# Patient Record
Sex: FEMALE | Race: WHITE
[De-identification: names, ages, dates, MRNs, and addresses within clinical notes are randomized per-mention and may not be internally consistent; named-entity substitution may affect disease eponyms.]

---

## 2018-08-29 ENCOUNTER — HOSPITAL ENCOUNTER (EMERGENCY)
Dept: HOSPITAL 9 - MADERS | Age: 63
Discharge: HOME | End: 2018-08-29
Payer: MEDICARE

## 2018-08-29 DIAGNOSIS — F31.9: ICD-10-CM

## 2018-08-29 DIAGNOSIS — I10: ICD-10-CM

## 2018-08-29 DIAGNOSIS — K04.7: Primary | ICD-10-CM

## 2018-08-29 DIAGNOSIS — Z79.899: ICD-10-CM

## 2018-08-29 LAB
ANION GAP SERPL CALC-SCNC: 15 MMOL/L (ref 10–20)
BUN SERPL-MCNC: 23 MG/DL (ref 9.8–20.1)
CALCIUM SERPL-MCNC: 9.8 MG/DL (ref 7.8–10.44)
CHLORIDE SERPL-SCNC: 105 MMOL/L (ref 98–107)
CO2 SERPL-SCNC: 22 MMOL/L (ref 23–31)
CREAT CL PREDICTED SERPL C-G-VRATE: 0 ML/MIN (ref 70–130)
GLUCOSE SERPL-MCNC: 99 MG/DL (ref 80–115)
HGB BLD-MCNC: 17.1 G/DL (ref 12–16)
MANUAL DIFF??: YES
MCH RBC QN AUTO: 30 PG (ref 27–31)
MCV RBC AUTO: 90.6 FL (ref 78–98)
MDIFF COMPLETE?: YES
PLATELET # BLD AUTO: 217 THOU/UL (ref 130–400)
PLATELET BLD QL SMEAR: (no result)
POTASSIUM SERPL-SCNC: 4.3 MMOL/L (ref 3.5–5.1)
RBC # BLD AUTO: 5.7 MILL/UL (ref 4.2–5.4)
SODIUM SERPL-SCNC: 138 MMOL/L (ref 136–145)
WBC # BLD AUTO: 9.7 THOU/UL (ref 4.8–10.8)

## 2018-08-29 PROCEDURE — 83605 ASSAY OF LACTIC ACID: CPT

## 2018-08-29 PROCEDURE — 96375 TX/PRO/DX INJ NEW DRUG ADDON: CPT

## 2018-08-29 PROCEDURE — 85025 COMPLETE CBC W/AUTO DIFF WBC: CPT

## 2018-08-29 PROCEDURE — 96365 THER/PROPH/DIAG IV INF INIT: CPT

## 2018-08-29 PROCEDURE — 80048 BASIC METABOLIC PNL TOTAL CA: CPT

## 2019-07-19 ENCOUNTER — HOSPITAL ENCOUNTER (INPATIENT)
Dept: HOSPITAL 92 - ERS | Age: 64
LOS: 4 days | Discharge: HOME | DRG: 501 | End: 2019-07-23
Attending: SURGERY | Admitting: SURGERY
Payer: MEDICARE

## 2019-07-19 ENCOUNTER — HOSPITAL ENCOUNTER (EMERGENCY)
Dept: HOSPITAL 9 - MADERS | Age: 64
Discharge: ANOTHER HOSPITAL | End: 2019-07-19
Payer: MEDICARE

## 2019-07-19 VITALS — BODY MASS INDEX: 18.6 KG/M2

## 2019-07-19 DIAGNOSIS — I73.9: ICD-10-CM

## 2019-07-19 DIAGNOSIS — Z88.1: ICD-10-CM
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
## 2019-07-19 DIAGNOSIS — S62.617B: Primary | ICD-10-CM

## 2019-07-19 DIAGNOSIS — Z90.49: ICD-10-CM

## 2019-07-19 DIAGNOSIS — I13.0: ICD-10-CM

## 2019-07-19 DIAGNOSIS — N17.9: ICD-10-CM

## 2019-07-19 DIAGNOSIS — I69.320: ICD-10-CM

## 2019-07-19 DIAGNOSIS — Z86.73: ICD-10-CM

## 2019-07-19 DIAGNOSIS — Z90.710: ICD-10-CM

## 2019-07-19 DIAGNOSIS — S80.211A: ICD-10-CM

## 2019-07-19 DIAGNOSIS — Z79.899: ICD-10-CM

## 2019-07-19 DIAGNOSIS — Z88.0: ICD-10-CM

## 2019-07-19 DIAGNOSIS — I50.9: ICD-10-CM

## 2019-07-19 DIAGNOSIS — I25.10: ICD-10-CM

## 2019-07-19 DIAGNOSIS — N18.9: ICD-10-CM

## 2019-07-19 DIAGNOSIS — S51.812A: ICD-10-CM

## 2019-07-19 DIAGNOSIS — F17.210: ICD-10-CM

## 2019-07-19 DIAGNOSIS — S51.811A: ICD-10-CM

## 2019-07-19 DIAGNOSIS — I34.0: ICD-10-CM

## 2019-07-19 DIAGNOSIS — I10: ICD-10-CM

## 2019-07-19 DIAGNOSIS — S80.212A: ICD-10-CM

## 2019-07-19 DIAGNOSIS — F31.9: ICD-10-CM

## 2019-07-19 LAB
ALBUMIN SERPL BCG-MCNC: 4.2 G/DL (ref 3.4–4.8)
ALP SERPL-CCNC: 123 U/L (ref 40–150)
ALT SERPL W P-5'-P-CCNC: 14 U/L (ref 8–55)
ANION GAP SERPL CALC-SCNC: 18 MMOL/L (ref 10–20)
AST SERPL-CCNC: 24 U/L (ref 5–34)
BASOPHILS # BLD AUTO: 0.1 THOU/UL (ref 0–0.2)
BASOPHILS NFR BLD AUTO: 0.9 % (ref 0–1)
BILIRUB SERPL-MCNC: 0.4 MG/DL (ref 0.2–1.2)
BUN SERPL-MCNC: 31 MG/DL (ref 9.8–20.1)
CALCIUM SERPL-MCNC: 9.3 MG/DL (ref 7.8–10.44)
CHLORIDE SERPL-SCNC: 109 MMOL/L (ref 98–107)
CO2 SERPL-SCNC: 21 MMOL/L (ref 23–31)
CREAT CL PREDICTED SERPL C-G-VRATE: 0 ML/MIN (ref 70–130)
EOSINOPHIL # BLD AUTO: 0.3 THOU/UL (ref 0–0.7)
EOSINOPHIL NFR BLD AUTO: 3.6 % (ref 0–10)
GLOBULIN SER CALC-MCNC: 3 G/DL (ref 2.4–3.5)
GLUCOSE SERPL-MCNC: 88 MG/DL (ref 80–115)
HGB BLD-MCNC: 15.8 G/DL (ref 12–16)
LYMPHOCYTES # BLD AUTO: 1.2 THOU/UL (ref 1.2–3.4)
LYMPHOCYTES NFR BLD AUTO: 14.8 % (ref 21–51)
MCH RBC QN AUTO: 30 PG (ref 27–31)
MCV RBC AUTO: 94.5 FL (ref 78–98)
MONOCYTES # BLD AUTO: 0.6 THOU/UL (ref 0.11–0.59)
MONOCYTES NFR BLD AUTO: 7.5 % (ref 0–10)
NEUTROPHILS # BLD AUTO: 6.1 THOU/UL (ref 1.4–6.5)
NEUTROPHILS NFR BLD AUTO: 73.3 % (ref 42–75)
PLATELET # BLD AUTO: 157 THOU/UL (ref 130–400)
POTASSIUM SERPL-SCNC: 5.1 MMOL/L (ref 3.5–5.1)
RBC # BLD AUTO: 5.28 MILL/UL (ref 4.2–5.4)
SODIUM SERPL-SCNC: 143 MMOL/L (ref 136–145)
WBC # BLD AUTO: 8.3 THOU/UL (ref 4.8–10.8)

## 2019-07-19 PROCEDURE — 36415 COLL VENOUS BLD VENIPUNCTURE: CPT

## 2019-07-19 PROCEDURE — 90732 PPSV23 VACC 2 YRS+ SUBQ/IM: CPT

## 2019-07-19 PROCEDURE — 90715 TDAP VACCINE 7 YRS/> IM: CPT

## 2019-07-19 PROCEDURE — 83735 ASSAY OF MAGNESIUM: CPT

## 2019-07-19 PROCEDURE — 96365 THER/PROPH/DIAG IV INF INIT: CPT

## 2019-07-19 PROCEDURE — 76000 FLUOROSCOPY <1 HR PHYS/QHP: CPT

## 2019-07-19 PROCEDURE — 85025 COMPLETE CBC W/AUTO DIFF WBC: CPT

## 2019-07-19 PROCEDURE — 85610 PROTHROMBIN TIME: CPT

## 2019-07-19 PROCEDURE — 36416 COLLJ CAPILLARY BLOOD SPEC: CPT

## 2019-07-19 PROCEDURE — 93306 TTE W/DOPPLER COMPLETE: CPT

## 2019-07-19 PROCEDURE — 83605 ASSAY OF LACTIC ACID: CPT

## 2019-07-19 PROCEDURE — 26720 TREAT FINGER FRACTURE EACH: CPT

## 2019-07-19 PROCEDURE — 86140 C-REACTIVE PROTEIN: CPT

## 2019-07-19 PROCEDURE — 84100 ASSAY OF PHOSPHORUS: CPT

## 2019-07-19 PROCEDURE — 96366 THER/PROPH/DIAG IV INF ADDON: CPT

## 2019-07-19 PROCEDURE — 93010 ELECTROCARDIOGRAM REPORT: CPT

## 2019-07-19 PROCEDURE — 90471 IMMUNIZATION ADMIN: CPT

## 2019-07-19 PROCEDURE — G0390 TRAUMA RESPONS W/HOSP CRITI: HCPCS

## 2019-07-19 PROCEDURE — 93005 ELECTROCARDIOGRAM TRACING: CPT

## 2019-07-19 PROCEDURE — G0009 ADMIN PNEUMOCOCCAL VACCINE: HCPCS

## 2019-07-19 PROCEDURE — 71045 X-RAY EXAM CHEST 1 VIEW: CPT

## 2019-07-19 PROCEDURE — 80053 COMPREHEN METABOLIC PANEL: CPT

## 2019-07-19 PROCEDURE — 83880 ASSAY OF NATRIURETIC PEPTIDE: CPT

## 2019-07-19 PROCEDURE — 96368 THER/DIAG CONCURRENT INF: CPT

## 2019-07-19 PROCEDURE — 94640 AIRWAY INHALATION TREATMENT: CPT

## 2019-07-19 PROCEDURE — 80048 BASIC METABOLIC PNL TOTAL CA: CPT

## 2019-07-19 PROCEDURE — 82533 TOTAL CORTISOL: CPT

## 2019-07-19 NOTE — RAD
3 views left hand:

7/19/2019



COMPARISON: None



HISTORY: Injury, trauma, pain, dog bite



FINDINGS: There is a mildly displaced mildly comminuted obliquely oriented fracture involving the bas
e of the fifth proximal phalanx. There is dorsal angulation and mild medial angulation as well as

mild proximal and medial displacement. No intra-articular extension. No evidence for dislocation. No 
additional fracture is noted.



IMPRESSION: Fracture at the base of the fifth proximal phalanx, presumably an open fracture given his
tory of dog bite.



Reported By: Frank Shanks 

Electronically Signed:  7/19/2019 7:40 PM

## 2019-07-20 LAB
ANION GAP SERPL CALC-SCNC: 10 MMOL/L (ref 10–20)
ANION GAP SERPL CALC-SCNC: 11 MMOL/L (ref 10–20)
BASOPHILS # BLD AUTO: 0 THOU/UL (ref 0–0.2)
BASOPHILS # BLD AUTO: 0 THOU/UL (ref 0–0.2)
BASOPHILS NFR BLD AUTO: 0.1 % (ref 0–1)
BASOPHILS NFR BLD AUTO: 0.1 % (ref 0–1)
BUN SERPL-MCNC: 22 MG/DL (ref 9.8–20.1)
BUN SERPL-MCNC: 24 MG/DL (ref 9.8–20.1)
CALCIUM SERPL-MCNC: 8.4 MG/DL (ref 7.8–10.44)
CALCIUM SERPL-MCNC: 8.5 MG/DL (ref 7.8–10.44)
CHLORIDE SERPL-SCNC: 111 MMOL/L (ref 98–107)
CHLORIDE SERPL-SCNC: 113 MMOL/L (ref 98–107)
CO2 SERPL-SCNC: 21 MMOL/L (ref 23–31)
CO2 SERPL-SCNC: 22 MMOL/L (ref 23–31)
CREAT CL PREDICTED SERPL C-G-VRATE: 0 ML/MIN (ref 70–130)
CREAT CL PREDICTED SERPL C-G-VRATE: 40 ML/MIN (ref 70–130)
EOSINOPHIL # BLD AUTO: 0.2 THOU/UL (ref 0–0.7)
EOSINOPHIL # BLD AUTO: 0.2 THOU/UL (ref 0–0.7)
EOSINOPHIL NFR BLD AUTO: 2.9 % (ref 0–10)
EOSINOPHIL NFR BLD AUTO: 3.3 % (ref 0–10)
GLUCOSE SERPL-MCNC: 86 MG/DL (ref 80–115)
GLUCOSE SERPL-MCNC: 94 MG/DL (ref 80–115)
HGB BLD-MCNC: 12.8 G/DL (ref 12–16)
HGB BLD-MCNC: 14.1 G/DL (ref 12–16)
INR PPP: 1
LYMPHOCYTES # BLD: 0.9 THOU/UL (ref 1.2–3.4)
LYMPHOCYTES # BLD: 1.2 THOU/UL (ref 1.2–3.4)
LYMPHOCYTES NFR BLD AUTO: 15.6 % (ref 21–51)
LYMPHOCYTES NFR BLD AUTO: 17.4 % (ref 21–51)
MAGNESIUM SERPL-MCNC: 1.7 MG/DL (ref 1.6–2.6)
MAGNESIUM SERPL-MCNC: 1.9 MG/DL (ref 1.6–2.6)
MCH RBC QN AUTO: 30.2 PG (ref 27–31)
MCH RBC QN AUTO: 30.7 PG (ref 27–31)
MCV RBC AUTO: 93.9 FL (ref 78–98)
MCV RBC AUTO: 96.2 FL (ref 78–98)
MDIFF COMPLETE?: YES
MONOCYTES # BLD AUTO: 0.4 THOU/UL (ref 0.11–0.59)
MONOCYTES # BLD AUTO: 0.8 THOU/UL (ref 0.11–0.59)
MONOCYTES NFR BLD AUTO: 11.1 % (ref 0–10)
MONOCYTES NFR BLD AUTO: 7.8 % (ref 0–10)
NEUTROPHILS # BLD AUTO: 4.2 THOU/UL (ref 1.4–6.5)
NEUTROPHILS # BLD AUTO: 4.9 THOU/UL (ref 1.4–6.5)
NEUTROPHILS NFR BLD AUTO: 68.4 % (ref 42–75)
NEUTROPHILS NFR BLD AUTO: 73.2 % (ref 42–75)
PLATELET # BLD AUTO: 117 THOU/UL (ref 130–400)
PLATELET # BLD AUTO: 131 THOU/UL (ref 130–400)
POTASSIUM SERPL-SCNC: 4.3 MMOL/L (ref 3.5–5.1)
POTASSIUM SERPL-SCNC: 4.8 MMOL/L (ref 3.5–5.1)
PROTHROMBIN TIME: 13.6 SEC (ref 12–14.7)
RBC # BLD AUTO: 4.22 MILL/UL (ref 4.2–5.4)
RBC # BLD AUTO: 4.57 MILL/UL (ref 4.2–5.4)
SODIUM SERPL-SCNC: 139 MMOL/L (ref 136–145)
SODIUM SERPL-SCNC: 140 MMOL/L (ref 136–145)
WBC # BLD AUTO: 5.7 THOU/UL (ref 4.8–10.8)
WBC # BLD AUTO: 7.1 THOU/UL (ref 4.8–10.8)

## 2019-07-20 PROCEDURE — 0J9H0ZZ DRAINAGE OF LEFT LOWER ARM SUBCUTANEOUS TISSUE AND FASCIA, OPEN APPROACH: ICD-10-PCS | Performed by: ORTHOPAEDIC SURGERY

## 2019-07-20 PROCEDURE — 0PSV34Z REPOSITION LEFT FINGER PHALANX WITH INTERNAL FIXATION DEVICE, PERCUTANEOUS APPROACH: ICD-10-PCS | Performed by: ORTHOPAEDIC SURGERY

## 2019-07-20 PROCEDURE — 0J9K0ZZ DRAINAGE OF LEFT HAND SUBCUTANEOUS TISSUE AND FASCIA, OPEN APPROACH: ICD-10-PCS | Performed by: ORTHOPAEDIC SURGERY

## 2019-07-20 NOTE — RAD
Portable frontal chest radiograph:

7/20/2019



COMPARISON: None



HISTORY: Preoperative patient



FINDINGS: Lungs are clear. Heart and mediastinal contours appear within normal limits. Mild atheroscl
erotic calcification of the aortic arch.



IMPRESSION: No acute findings.



Reported By: Frank Shanks 

Electronically Signed:  7/20/2019 12:12 AM

## 2019-07-20 NOTE — HP
CHIEF COMPLAINT:  Dog bite of arm.



HISTORY OF PRESENT ILLNESS:  The patient is a 64-year-old female, who was trying to

break up a fight between 2 dogs.  They then turned on her and lacerated her arms,

left greater than right.  She also had what appears to be an open fracture and is

going to the OR for that. 



PAST MEDICAL HISTORY:  CVA, coronary artery disease, and hypertension.



PAST SURGICAL HISTORY:  Appendectomy and hysterectomy.



MEDICATIONS:  

1. Amlodipine.

2. Atorvastatin.

3. Gabapentin.

4. Metoprolol.



SOCIAL HISTORY:  She does still smoke 1/2 packs per day.  Past methamphetamine use.

She does drink alcohol.  She has bipolar. 



ALLERGIES:  PENICILLIN AND DOXYCYCLINE.



PHYSICAL EXAMINATION:

VITAL SIGNS:  Temperature 97.7, pulse 65, and blood pressure 99/64. 

GENERAL:  She is a thin female, looks older than stated age, in no apparent

distress.  Left arm is bandaged. 

HEENT:  Otherwise unremarkable. 

LUNGS:  Clear. 

HEART:  Regular rate and rhythm. 

ABDOMEN:  Soft, nondistended, and nontender. 

EXTREMITIES:  She has multiple lacerations of her left forearm and puncture wounds

of the right forearm.  She is currently in a splint on the left.  Pulses intact.

She has a mild numbness of lateral fifth digit. 



LABORATORY DATA:  White count 7.1, hemoglobin and hematocrit of 14 and 42, platelet

count 117.  Electrolytes; creatinine 1.1, chloride 113, CO2 of 22.  X-rays show

fractures in left hand. 



ASSESSMENT:  Open fracture, left hand.



PLAN:  Treatment per Orthopedics.







Job ID:  [id number]

## 2019-07-20 NOTE — RAD
EXAM: 3 views of the left small finger



HISTORY: Proximal phalanx fracture



COMPARISON: None



FINDINGS: A single limited fluoroscopic view of the small finger shows the patient is status post K w
jinny fixation of the proximal phalanx fracture. 



IMPRESSION: Status post percutaneous fixation of proximal phalanx fracture



Reported By: Tee Guzman 

Electronically Signed:  7/20/2019 1:12 PM

## 2019-07-20 NOTE — CON
DATE OF CONSULTATION:  07/20/2019



BRIEF HISTORY OF PRESENT ILLNESS:  The patient is a 64-year-old right-hand dominant

lady, who was transferred from the [place] [facility] to [place] in

[place] following a dog bite to her left hand and forearm.  She reports that her pit

bull began to fight with another pit bull, when she tried to break the fight up when

she was bit in the hand and forearm.  Upon evaluation in [place], she was found

to have a fracture at the base of the proximal phalanx of the small finger with a

puncture wound dorsally.  There was some displacement.  The patient was given

antibiotics in [place], and was also found to be mildly hypotensive.  She was

given some IV fluids and then transferred to [place] where the Trauma Team

admitted her for anticipated orthopedic irrigation, debridement, and stabilization

of the fracture.  This morning, the patient is examined in her hospital bed.  She is

awake, alert, and appears comfortable. 



PAST MEDICAL HISTORY:  Remarkable for a stroke approximately 4 years ago with some

right-sided strength deficit.  She also has an expressive aphasia.  The patient also

with a history of heart murmur, coronary artery disease, and hypertension. 



PAST SURGICAL HISTORY:  Includes hysterectomy and appendectomy.



MEDICATIONS:  Include,

1. Metoprolol once daily.

2. Atorvastatin.

3. Amlodipine.

4. Gabapentin.



ALLERGIES:  TO PENICILLIN AND DOXYCYCLINE.



SOCIAL HISTORY:  The patient is a smoker and has a nearly 50 pack-year history.

Does have a past history of drug use and does consume alcoholic beverages daily. 



FAMILY HISTORY:  Noncontributory for this admission.



REVIEW OF SYSTEMS:  No recent fevers, chills, or sweats.  The patient denies chest

pain or shortness of breath.  She denies numbness or tingling in the left hand.  She

does have some mild sensory deficits in the right hand secondary to her stroke. 



PHYSICAL EXAMINATION:

VITAL SIGNS:  She has a temperature of 97.8, heart rate of 60, respiratory rate of

18, and blood pressure of 111/70. 

HEENT:  Atraumatic and normocephalic. 

HEART:  Shows a regular rate and rhythm with a 3/6 systolic ejection murmur. 

LUNGS:  Clear to auscultation bilaterally with good breath sounds.  Chest wall is

nontender. 

EXTREMITIES:  Remarkable for bilateral lower extremities that are atraumatic.  A

right upper extremity with some diffuse weakness within the arm, but no deformities

or focal complaints of pain.  A left upper extremity with an atraumatic shoulder and

elbow.  She was found to have some puncture wounds scattered along the forearm, but

without extensive bleeding.  Her forearm compartments are soft.  Distally, she has a

puncture wound over the dorsal aspect of the base of the small finger.  She has an

extension deformity at this fracture at the base of the proximal phalanx.  She has

intact sensation in the radial, median, and ulnar distributions. 



LABORATORY DATA:  She was found to have a white count of 7.1, a hematocrit of 42.9,

and 117,000 platelets. 



IMAGING STUDIES:  Left hand x-ray remarkable for fracture of the base of the fifth

digit with dorsiflexion deformity. 



ASSESSMENT:  Status post dog bite injury to left hand with open fracture of left

proximal phalanx as well as some abrasions and lacerations of the left forearm. 



PLAN:  At this time, the patient will be taken to the operating room this morning

for irrigation and debridement of her open wounds and probable pin stabilization of

this 

fracture.  Today, I discussed with the patient risks and benefits of this proposed

procedure.  Risks include, but are not limited to bleeding, infection, nerve injury, 

nonunion, malunion, loss of limb or life.  The patient appears to understand and

does wish to proceed.  Consent will be obtained prior to her surgical procedure. 







Job ID:  [id number]

## 2019-07-20 NOTE — HP
REQUESTING ER PHYSICIAN: Gerardo



CONSULTS:  Orthopedic Surgery, Dr. Castillo.



HISTORY OF PRESENT ILLNESS:  This is a 64-year-old female who was a transfer 
from

[place] [facility], status post dog bite.  The patient was dog sitting and her pit 
bull

and the other pit bull started to fight and she attempted to break them up when 
she

was bit by the dog that she was dog sitting.  The patient reports that the dogs 
are

up-to-date on all vaccinations.  The patient reports pain to left hand and 
wounds to

both forearms.  The patient was given 1 L of normal saline in [facility] 
and

also vancomycin IV, and morphine 10 mg by EMS for pain.  The patient last ate at

around 8 a.m. this morning.  Trauma service was contacted for admission, 
Orthopedic

Surgery plans to take the patient for washout tomorrow to left open fracture. 



PAST MEDICAL HISTORY:  Stroke 4 years ago with mild right-sided deficit and 
aphasia

which has improved, heart murmur, coronary artery disease, hypertension. 



PAST SURGICAL HISTORY:  Appendectomy, hysterectomy.



SOCIAL HISTORY:  Current smoker since the patient was 17 years old, reports past

methamphetamine use, no current illicit drug use, the patient drinks alcohol 
either

wine or beer nearly every day. 



PSYCHIATRIC HISTORY:  Bipolar.



ALLERGIES:  PENICILLIN AND DOXYCYCLINE.



MEDICATIONS:  

1. Amlodipine 2.5 mg.

2. Atorvastatin 40 mg at bedtime.

3. Gabapentin 600 mg q.8 hours.

4. Metoprolol extended release 50 mg once a day.



REVIEW OF SYSTEMS:  A 10-point review of systems is negative unless otherwise

indicated in the above HPI. 



PHYSICAL EXAMINATION:

VITAL SIGNS:  Blood pressure 113/86, heart rate 73, respirations 20, SpO2 of 96
% on

room air, temperature 98. 

GENERAL:  The patient is awake, alert, in no distress. 

HEENT:  Head is atraumatic and normocephalic.  Mucous membranes dry. 

CARDIOVASCULAR:  Regular rate, regular rhythm.  Positive 3/5 systolic murmur, no

pedal edema, no JVD. 

RESPIRATORY:  Chest rise symmetrical, bilateral breath sounds clear, no wheezing
,

rales, or rhonchi. 

EXTREMITIES:  Dog bites and lacerations to both upper extremities, left 5th 
digit

with swelling and bruising, normal sensation and movement distally, distal 
pulses 2+

in all extremities, bilateral abrasions to knees. 

NEUROLOGIC:  No focal deficit, mild weakness to right side from previous stroke,

mild aphasia from CVA. 



LABORATORY DATA:  WBC 8.3, RBC 5.28, hemoglobin 15.8, hematocrit 49.9, platelets

157.  Sodium 143, potassium 5.1, chloride 109, carbon dioxide 21, anion gap 18, 
BUN

31, creatinine 1.75, estimated GFR 29, glucose 88, lactic acid 1.4, calcium 9.3,

total bilirubin 0.4, AST 24, ALT 14, alkaline phosphatase 123.  C-reactive 
protein

less than 0.50. 



DIAGNOSTICS:  Left hand x-ray, impression, fracture at the base of the 5th 
proximal

phalanx, presumably an open fracture. 



Chest x-ray is pending.



IMPRESSION:  

1. Status post dog bite.

2. Left open hand fracture.

3. Bilateral abrasions and lacerations on both arms.

4. Acute kidney injury on chronic.



PLAN:  We will admit the patient to the surgical ortho floor.  Dr. Castillo was

consulted by ER physician, who reports he plans to take the patient for a 
washout

tomorrow around 11.  We will place the patient on a pain regimen.  The patient 
will

be n.p.o. after midnight and placed on maintenance fluids.  We will repeat lab 
work 

in the morning to see if kidney function has improved. 

The plan will be discussed with the attending surgeon after this dictation. 







Job ID:  [id number]



[facility]

## 2019-07-21 LAB
ANION GAP SERPL CALC-SCNC: 10 MMOL/L (ref 10–20)
BASOPHILS # BLD AUTO: 0.1 THOU/UL (ref 0–0.2)
BASOPHILS NFR BLD AUTO: 1.4 % (ref 0–1)
BUN SERPL-MCNC: 22 MG/DL (ref 9.8–20.1)
CALCIUM SERPL-MCNC: 8.6 MG/DL (ref 7.8–10.44)
CHLORIDE SERPL-SCNC: 111 MMOL/L (ref 98–107)
CO2 SERPL-SCNC: 21 MMOL/L (ref 23–31)
CREAT CL PREDICTED SERPL C-G-VRATE: 35 ML/MIN (ref 70–130)
EOSINOPHIL # BLD AUTO: 0 THOU/UL (ref 0–0.7)
EOSINOPHIL NFR BLD AUTO: 0.2 % (ref 0–10)
GLUCOSE SERPL-MCNC: 132 MG/DL (ref 80–115)
HGB BLD-MCNC: 12.1 G/DL (ref 12–16)
LYMPHOCYTES # BLD: 0.2 THOU/UL (ref 1.2–3.4)
LYMPHOCYTES NFR BLD AUTO: 4.2 % (ref 21–51)
MAGNESIUM SERPL-MCNC: 2.3 MG/DL (ref 1.6–2.6)
MCH RBC QN AUTO: 30.5 PG (ref 27–31)
MCV RBC AUTO: 96.7 FL (ref 78–98)
MONOCYTES # BLD AUTO: 0.2 THOU/UL (ref 0.11–0.59)
MONOCYTES NFR BLD AUTO: 2.9 % (ref 0–10)
NEUTROPHILS # BLD AUTO: 4.8 THOU/UL (ref 1.4–6.5)
NEUTROPHILS NFR BLD AUTO: 91.3 % (ref 42–75)
PLATELET # BLD AUTO: 129 THOU/UL (ref 130–400)
POTASSIUM SERPL-SCNC: 5.2 MMOL/L (ref 3.5–5.1)
RBC # BLD AUTO: 3.95 MILL/UL (ref 4.2–5.4)
SODIUM SERPL-SCNC: 137 MMOL/L (ref 136–145)
WBC # BLD AUTO: 5.3 THOU/UL (ref 4.8–10.8)

## 2019-07-21 RX ADMIN — DOCUSATE SODIUM 50 MG AND SENNOSIDES 8.6 MG SCH TAB: 8.6; 5 TABLET, FILM COATED ORAL at 20:15

## 2019-07-21 RX ADMIN — HYDROCORTISONE SODIUM SUCCINATE SCH MG: 100 INJECTION, POWDER, FOR SOLUTION INTRAMUSCULAR; INTRAVENOUS at 05:31

## 2019-07-21 RX ADMIN — HYDROCORTISONE SODIUM SUCCINATE SCH MG: 100 INJECTION, POWDER, FOR SOLUTION INTRAMUSCULAR; INTRAVENOUS at 17:43

## 2019-07-21 RX ADMIN — HYDROCORTISONE SODIUM SUCCINATE SCH MG: 100 INJECTION, POWDER, FOR SOLUTION INTRAMUSCULAR; INTRAVENOUS at 00:13

## 2019-07-21 RX ADMIN — HYDROCORTISONE SODIUM SUCCINATE SCH MG: 100 INJECTION, POWDER, FOR SOLUTION INTRAMUSCULAR; INTRAVENOUS at 11:42

## 2019-07-21 NOTE — OP
DATE OF PROCEDURE:  07/20/2019



PREOPERATIVE DIAGNOSES:  

1. Open fracture, left small finger proximal phalanx.

2. Multiple soft tissue dog bite wounds, left hand and left forearm.



POSTOPERATIVE DIAGNOSES:  

1. Open fracture, left small finger proximal phalanx, extra-articular.

2. Lacerations of dorsal aspect of left palm proximal to the metacarpophalangeal

joint measuring approximately 3 cm with forearm lacerations in total measuring

approximately 5 cm with puncture wounds through the fascia. 



PROCEDURE PERFORMED:  

1. Closed reduction and percutaneous pin stabilization of left proximal phalanx

fracture, small finger. 

2. Irrigation and debridement of multiple bite wounds to left hand and left forearm.

3. Wound closure of left forearm and left dorsal hand laceration measuring 3 cm.



ANESTHESIA:  General.



TOURNIQUET TIME:  29 minutes at 250 mmHg.



COMPLICATIONS:  None.



DRAINS:  None.



SPECIMEN:  None.



INDICATIONS FOR PROCEDURE:  The patient is a 64-year-old right-hand dominant lady,

who is status post dog bite wound to left hand and forearm.  X-rays demonstrate a

fracture of the base of the proximal phalanx.  The patient now is taken to the

operating room for irrigation and debridement of these multiple bite wounds as well

as stabilization of the proximal phalanx fracture. 



DESCRIPTION OF PROCEDURE:  The patient was brought to the operating room and a

time-out performed followed by induction of general anesthesia.  Next, a sterile

prep and drape were performed of the left upper extremity.  She was found to have

multiple puncture wounds in the forearm, approximately 4 of these appeared to be

deep to the fascia.  Next, the limb was elevated and then tourniquet inflated to 250

mmHg.  Next, the 4 puncture wounds were felt to go deep to the fascia were

increased, incised with a scalpel such that the fascia could be appreciated.

Indeed, there was penetration of the fascia.  These were explored and found to be

free of any obvious foreign body.  Next, each of these 4 wounds were thoroughly

irrigated with bulb syringe and normal saline with  antibiotic irrigant added.

Once thoroughly irrigated, further inspection was made and no necrotic tissue was

encountered.  In 3 of these 4 small wounds, the surgical extension was closed with

4-0 nylon loosely with the traumatic portion of the wound left open for drainage.

Next, attention was placed to the left hand.  She was found to have 2 puncture

wounds, 1 over the volar surface of the ulnar side of the hand just proximal to the

metacarpophalangeal joint.  The other 1 more jagged and lengthy and laceration

dorsally.  These were both inspected.  The volar wound found to be very superficial.

 The dorsal wound did extend into the extensor tendons, although the tendons as

visualized through this traumatic wound were found to be intact.  This wound was

irrigated with a liter of normal saline using bulb syringe as was the palmar wound.

Next, the fracture was inspected with C-arm.  It was found to be dorsally angulated

as such it was reduced manually, and then, 2 K-wires measuring 0.045 were passed

from the base of the proximal phalanx obliquely across the fracture and up into the

distal shaft.  This restored angular alignment and also rotational alignment.  Final

AP and lateral C-arm images were then obtained.  The traumatic wounds were then

loosely reapproximated with 4-0 nylon.  At the completion of this, Xeroform bulky

gauze and fiberglass splint dressing were applied to the arm, and then, the patient

was transferred to recovery room in stable condition.  Tourniquet was let down at

the completion of dressing. 







Job ID:  [id number]

## 2019-07-21 NOTE — PRG
DATE OF SERVICE:  07/21/2019



SUBJECTIVE:  The patient was seen this morning, lying in bed and nasal cannula

oxygen in place, reported pain is well controlled.  Bilateral upper extremities with

dressings and left upper extremity with splint in place.  Postoperatively, the

patient had episode of hypotension that was resolved with 500 mL of crystalloid

intraoperatively.  The patient was also hypotensive and received vasopressors.  This

morning, she is awake and alert and blood pressure has improved.  She is continuing

to urinate normal saline at 50 an hour.  She denies nausea, vomiting, or diarrhea. 



OBJECTIVE:  GENERAL:  Middle-aged female, sitting in bed with no signs of acute

distress. 

PULMONARY:  Equal chest rise and fall.  Clear breath sounds bilaterally.  No signs

of acute respiratory distress.  However, the patient does take very shallow breaths

naturally. 

CARDIAC:  Regular rate and rhythm.  No murmurs, gallops, or rubs. 

GI:  Abdomen is soft, nontender, and nondistended. 

EXTREMITIES:  2+ pulses in all extremities.  No significant swelling noted.  Gross

motor and sensation intact.  Dressings to bilateral upper extremities and splint in

place to left upper extremity. 

NEUROLOGIC:  GCS is 15.  Gross motor and sensation are intact.  Pupils equal, round,

and reactive to light bilaterally. 



LABORATORY FINDINGS:  White count 5.3, hemoglobin 12.1, hematocrit 38.2, platelets

129.  Sodium 134, potassium 5.2, chloride 111, carbon dioxide 21, BUN 22, creatinine

1.33, and glucose 134. 



DIAGNOSTIC FINDINGS:  There are no new diagnostic findings to report.



ASSESSMENT:  

1. Status post attack by dog.

2. Fracture of the left base of the fifth proximal phalanx.

3. Wounds to bilateral upper extremities, status post repair.

4. Acute on chronic kidney disease, worse today.

5. History of hypertension and bipolar disorder.



PLAN:  The patient was urgently going to be discharged today; however, her kidney

function has slightly gotten worse.  It is likely that it was caused from the

hypotensive episode yesterday.  We will continue normal saline at 50 an hour and

reassess her kidney function at that time.  The patient is complaining that she is

drowsy, so we will discontinue the Serax today and monitor closely for signs of

alcohol withdrawal and consider restarting at a smaller dose at that time.  Continue

current pain regimen.  The patient denying DVT prophylaxis with aspirin because she

reports she has a history of GI bleed.  She would not like to receive anticoagulants

at this time.  Continue current antibiotics as prescribed by Orthopedic Surgery.

Restart home medications as clinically indicated.  The patient was discussed with

Dr. Lewis before this dictation. 







Job ID:  [id number]

## 2019-07-21 NOTE — RAD
Portable frontal chest radiograph:

7/21/2019



COMPARISON: 7/19/2019



HISTORY: Shortness of breath



FINDINGS: There is new diffuse interstitial prominence with new pulmonary vascular congestion. Small 
new bilateral pleural effusions are noted with new airspace disease within the right lung base.



IMPRESSION: Findings suggesting interval development of pulmonary edema. Infectious pneumonitis canno
t be excluded. Follow-up imaging following treatment to document resolution advised.



Reported By: Frank Shanks 

Electronically Signed:  7/21/2019 4:56 PM

## 2019-07-22 LAB
ANION GAP SERPL CALC-SCNC: 11 MMOL/L (ref 10–20)
BASOPHILS # BLD AUTO: 0 THOU/UL (ref 0–0.2)
BASOPHILS NFR BLD AUTO: 0.2 % (ref 0–1)
BUN SERPL-MCNC: 22 MG/DL (ref 9.8–20.1)
CALCIUM SERPL-MCNC: 8.7 MG/DL (ref 7.8–10.44)
CHLORIDE SERPL-SCNC: 110 MMOL/L (ref 98–107)
CO2 SERPL-SCNC: 24 MMOL/L (ref 23–31)
CREAT CL PREDICTED SERPL C-G-VRATE: 34 ML/MIN (ref 70–130)
EOSINOPHIL # BLD AUTO: 0 THOU/UL (ref 0–0.7)
EOSINOPHIL NFR BLD AUTO: 0.1 % (ref 0–10)
GLUCOSE SERPL-MCNC: 138 MG/DL (ref 80–115)
HGB BLD-MCNC: 11.4 G/DL (ref 12–16)
LYMPHOCYTES # BLD: 0.5 THOU/UL (ref 1.2–3.4)
LYMPHOCYTES NFR BLD AUTO: 5 % (ref 21–51)
MAGNESIUM SERPL-MCNC: 2 MG/DL (ref 1.6–2.6)
MCH RBC QN AUTO: 30.3 PG (ref 27–31)
MCV RBC AUTO: 96.6 FL (ref 78–98)
MONOCYTES # BLD AUTO: 0.4 THOU/UL (ref 0.11–0.59)
MONOCYTES NFR BLD AUTO: 3.8 % (ref 0–10)
NEUTROPHILS # BLD AUTO: 8.7 THOU/UL (ref 1.4–6.5)
NEUTROPHILS NFR BLD AUTO: 90.9 % (ref 42–75)
PLATELET # BLD AUTO: 144 THOU/UL (ref 130–400)
POTASSIUM SERPL-SCNC: 4 MMOL/L (ref 3.5–5.1)
RBC # BLD AUTO: 3.76 MILL/UL (ref 4.2–5.4)
SODIUM SERPL-SCNC: 141 MMOL/L (ref 136–145)
WBC # BLD AUTO: 9.6 THOU/UL (ref 4.8–10.8)

## 2019-07-22 RX ADMIN — DOCUSATE SODIUM 50 MG AND SENNOSIDES 8.6 MG SCH TAB: 8.6; 5 TABLET, FILM COATED ORAL at 08:41

## 2019-07-22 RX ADMIN — HYDROCORTISONE SODIUM SUCCINATE SCH MG: 100 INJECTION, POWDER, FOR SOLUTION INTRAMUSCULAR; INTRAVENOUS at 17:16

## 2019-07-22 RX ADMIN — HYDROCORTISONE SODIUM SUCCINATE SCH MG: 100 INJECTION, POWDER, FOR SOLUTION INTRAMUSCULAR; INTRAVENOUS at 00:11

## 2019-07-22 RX ADMIN — HYDROCORTISONE SODIUM SUCCINATE SCH MG: 100 INJECTION, POWDER, FOR SOLUTION INTRAMUSCULAR; INTRAVENOUS at 05:37

## 2019-07-22 RX ADMIN — DOCUSATE SODIUM 50 MG AND SENNOSIDES 8.6 MG SCH TAB: 8.6; 5 TABLET, FILM COATED ORAL at 20:38

## 2019-07-22 RX ADMIN — HYDROCORTISONE SODIUM SUCCINATE SCH MG: 100 INJECTION, POWDER, FOR SOLUTION INTRAMUSCULAR; INTRAVENOUS at 11:51

## 2019-07-22 NOTE — PRG
DATE OF SERVICE:  07/22/2019



SUBJECTIVE:  The patient was seen this morning sitting up in bed with nasal cannula

oxygen, reporting shortness of breath is much better and improved.  Yesterday

afternoon, the patient developed some shortness of breath after taking a nap with

SpO2 at 80.  She was placed on 2 L nasal cannula and given a DuoNeb breathing

treatment.  She also received 20 mg of IV Lasix twice.  She does report that her

symptoms are almost resolved today.  She does have a history of a heart murmur.

Initially, she did not know which valve it was, but after an echo completed today

showed it was a mitral valve regurgitation.  Cardiology was consulted for further

evaluation.  She is postoperative day 3 now, status post closed reduction of the

left small finger and I and D of multiple bite wounds on her left hand and left

forearm with wound closure of the left forearm and left dorsum of the hand.  She is

tolerating a regular diet, ambulating independently.  Chest x-ray and EKG were also

completed yesterday.  She is back on all of her home medications at this time. 



OBJECTIVE:  VITAL SIGNS:  Temperature 98.4, pulse 89, respirations 16, oxygen

saturation 94% on room air, and blood pressure 126/71. 

GENERAL:  Middle-aged female, sitting up in bed with no signs of acute distress. 

PULMONARY:  Equal chest rise and fall.  Minimal crackles at the bilateral bases, but

otherwise clear in the upper lungs.  No signs of acute respiratory distress.  No

wheezing.  She does take very shallow breaths naturally.  She does have 2 L nasal

cannula oxygen at this time. 

CARDIAC:  Regular rate and rhythm.  No murmurs, gallops, or rubs. 

GI:  Abdomen is soft, nontender, and nondistended. 

EXTREMITIES:  2+ pulses in all extremities.  No significant swelling noted.  Gross

motor and sensation are intact.  Dressings to the bilateral upper extremities and a

splint in place to the left upper extremity. 

NEUROLOGIC:  GCS is 15.  Gross motor and sensation are intact.  Pupils equal, round,

and reactive to light bilaterally. 



LABORATORY DATA:  White count 9.6, hemoglobin 11.9, hematocrit 36.3, and platelets

144.  Sodium 141, __________, chloride 100, carbon dioxide 24, BUN 22, creatinine

1.36, glucose 138, __________, magnesium 2.0.  BNP 2657. 



DIAGNOSTIC FINDINGS:  Chest x-ray completed this morning demonstrates cardiomegaly

with some nonspecific interstitial markings and slightly more confluent changes in

the right base.  The right basilar changes are similar to the prior exam.

Interstitial change shows a suggestion of some slight improvement, which may

indicate some resolving edema.  There is some blunting in the right costophrenic

angle, suggestive of small right effusion. 



ASSESSMENT:  

1. Status post attack by a dog.

2. Fracture of the left base of the fifth proximal phalanx.

3. Wounds to bilateral upper extremities, status post repair.

4. Acute on chronic injury, stable today.

5. Chronic heart failure exacerbation.

6. History of hypertension and bipolar disorder.



PLAN:  The patient received 20 mg of IV Lasix twice yesterday as well as DuoNeb

twice.  Her symptoms are much improved today.  Echo was completed and unofficial

review demonstrated severe mitral regurgitation.  EKG completed yesterday also

showed left ventricular hypertrophy.  We will consult Cardiology and Dr. Tubbs has

agreed to see the patient today.  Continue current diet and pain medications.  We

will touch base with Ortho Surgery as they ordered Cipro and clindamycin for the

patient and we would prefer to switch to Augmentin.  The patient refusing aspirin

for DVT prophylaxis as she reports she has had a GI bleed previously.  We will start

her on Lovenox today.  We will also replace her phosphorus.  She is to continue to

work with Physical and Occupational Therapy.  We will follow up the recommendations

of Dr. Tubbs and determine disposition at that time.  The patient was seen and

examined by Dr. Bagley this morning during rounds. 







Job ID:  [id number]

## 2019-07-22 NOTE — RAD
PORTABLE CHEST:

 

COMPARISON: 

Prior day's exam.

 

HISTORY: 

Shortness of breath.

 

FINDINGS: 

Heart size is within normal limits.  There are atherosclerotic changes of the aorta.  Interstitial chelsea
ng markings appear slightly improved as compared to the prior examination.

 

IMPRESSION: 

Cardiomegaly with some increased interstitial markings and slightly more confluent changes in the rig
ht base.  The right basilar changes are similar to the prior exam.  Interstitial change show a sugges
tion of some slight improvement which may indicate some resolving edema.  There is some blunting to t
he right costophrenic angle suggesting a small right effusion.

 

POS: TPC

## 2019-07-22 NOTE — EKG
Test Reason : 

Blood Pressure : ***/*** mmHG

Vent. Rate : 060 BPM     Atrial Rate : 060 BPM

   P-R Int : 138 ms          QRS Dur : 082 ms

    QT Int : 450 ms       P-R-T Axes : 064 001 103 degrees

   QTc Int : 450 ms

 

Normal sinus rhythm

Left ventricular hypertrophy with repolarization abnormality

Abnormal ECG

When compared with ECG of 20-JUL-2019 09:34, (Unconfirmed)

No significant change was found

Confirmed by DR. NEGRA CRYSTAL (3) on 7/22/2019 11:47:22 AM

 

Referred By:  HITESH           Confirmed By:DR. NEGRA CRYSTAL

## 2019-07-22 NOTE — EKG
Test Reason : 

Blood Pressure : ***/*** mmHG

Vent. Rate : 107 BPM     Atrial Rate : 107 BPM

   P-R Int : 128 ms          QRS Dur : 088 ms

    QT Int : 342 ms       P-R-T Axes : 071 005 152 degrees

   QTc Int : 456 ms

 

Sinus tachycardia

Left ventricular hypertrophy with repolarization abnormality

Abnormal ECG

When compared with ECG of 20-JUL-2019 09:36, (Unconfirmed)

Vent. rate has increased BY  47 BPM

Confirmed by DR. NEGRA CRYSTAL (3) on 7/22/2019 11:51:45 AM

 

Referred By:             Confirmed By:DR. NEGRA CRYSTAL

## 2019-07-23 VITALS — TEMPERATURE: 98 F | SYSTOLIC BLOOD PRESSURE: 152 MMHG | DIASTOLIC BLOOD PRESSURE: 80 MMHG

## 2019-07-23 LAB
ANION GAP SERPL CALC-SCNC: 13 MMOL/L (ref 10–20)
BASOPHILS # BLD AUTO: 0 THOU/UL (ref 0–0.2)
BASOPHILS NFR BLD AUTO: 0.3 % (ref 0–1)
BUN SERPL-MCNC: 25 MG/DL (ref 9.8–20.1)
CALCIUM SERPL-MCNC: 8.5 MG/DL (ref 7.8–10.44)
CHLORIDE SERPL-SCNC: 105 MMOL/L (ref 98–107)
CO2 SERPL-SCNC: 25 MMOL/L (ref 23–31)
CREAT CL PREDICTED SERPL C-G-VRATE: 44 ML/MIN (ref 70–130)
EOSINOPHIL # BLD AUTO: 0 THOU/UL (ref 0–0.7)
EOSINOPHIL NFR BLD AUTO: 0.1 % (ref 0–10)
GLUCOSE SERPL-MCNC: 115 MG/DL (ref 80–115)
HGB BLD-MCNC: 11.4 G/DL (ref 12–16)
LYMPHOCYTES # BLD: 0.7 THOU/UL (ref 1.2–3.4)
LYMPHOCYTES NFR BLD AUTO: 8.5 % (ref 21–51)
MAGNESIUM SERPL-MCNC: 1.8 MG/DL (ref 1.6–2.6)
MCH RBC QN AUTO: 30.7 PG (ref 27–31)
MCV RBC AUTO: 96.2 FL (ref 78–98)
MONOCYTES # BLD AUTO: 0.4 THOU/UL (ref 0.11–0.59)
MONOCYTES NFR BLD AUTO: 4.1 % (ref 0–10)
NEUTROPHILS # BLD AUTO: 7.4 THOU/UL (ref 1.4–6.5)
NEUTROPHILS NFR BLD AUTO: 86.9 % (ref 42–75)
PLATELET # BLD AUTO: 147 THOU/UL (ref 130–400)
POTASSIUM SERPL-SCNC: 3.9 MMOL/L (ref 3.5–5.1)
RBC # BLD AUTO: 3.73 MILL/UL (ref 4.2–5.4)
SODIUM SERPL-SCNC: 139 MMOL/L (ref 136–145)
WBC # BLD AUTO: 8.5 THOU/UL (ref 4.8–10.8)

## 2019-07-23 RX ADMIN — DOCUSATE SODIUM 50 MG AND SENNOSIDES 8.6 MG SCH TAB: 8.6; 5 TABLET, FILM COATED ORAL at 08:25

## 2019-07-23 RX ADMIN — HYDROCORTISONE SODIUM SUCCINATE SCH MG: 100 INJECTION, POWDER, FOR SOLUTION INTRAMUSCULAR; INTRAVENOUS at 05:44

## 2019-07-23 RX ADMIN — HYDROCORTISONE SODIUM SUCCINATE SCH MG: 100 INJECTION, POWDER, FOR SOLUTION INTRAMUSCULAR; INTRAVENOUS at 00:09

## 2019-07-23 NOTE — CON
DATE OF CONSULTATION:  07/23/2019



REASON FOR CONSULTATION:  Coronary artery disease, uncontrolled hypertension,

peripheral vascular disease, tobacco abuse, recent dog bite. 



Mitral regurgitation.



HISTORY OF PRESENT ILLNESS:  Ms. Johnson is a 64-year-old woman with a long history

of heart disease.  She was seen here by Dr. Chapa in 2003.  She was found to have

inferior ischemia on stress testing and underwent stent implantation. 



She had a 4.0 x 15 mm near ARIELA stent. 



She underwent repeat catheterization, was found have no restenosis. 



She said she has had multiple stents placed in her heart.  She cannot give me any

details.  She said it is done by Dr. Matute.  Dr. Matute also placed stents in

her leg arteries. 



Unfortunately, she has continued to smoke. 



She is admitted on this occasion with dog bite. 



She does remember when she saw Dr. Matute last.



PAST MEDICAL HISTORY:  She also had a history of stroke with some aphasia in 2014.



MEDICATIONS:  Listed to be metoprolol 50 mg a day and amlodipine 2.5 mg a day.  She

said she cannot take aspirin or Plavix because of recurrent gastrointestinal

bleeding. 



ALLERGIES:  TO DOXYCYCLINE AND PENICILLIN.  AS MENTIONED, SHE SAID SHE CANNOT TAKE

ASPIRIN OR PLAVIX. 



SOCIAL HISTORY:  She continued to smoke.  She said she is determined to quit smoking

now. 



REVIEW OF SYSTEMS:  CONSTITUTIONAL:  No significant weight gain or loss. 

VISION:  No changes. 

HEARING:  No changes. 

PULMONARY:  No cough or wheezing. 

GASTROINTESTINAL:  No nausea, vomiting, diarrhea. 

SKIN:  No rashes. 

NEUROLOGIC:  No unilateral weakness or numbness.  She does have a history of

previous stroke with some aphasia, but she is able to communicate adequately. 



PHYSICAL EXAMINATION:

GENERAL:  This is a thin 64-year-old woman, 5 feet 5 inches tall, 112 pounds. 

NECK:  Neck veins are normal.  Carotid normal upstrokes. 

VITAL SIGNS:  Blood pressure 169/83, followed by 152/80.  Pulse 80s. 

HEENT:  Eyes; sclerae nonicteric.  Mouth, mucous membranes moist. 

LUNGS:  Clear. 

CARDIAC:  Normal S1, normal S2.  There is a 2/6 apical systolic murmur. 

ABDOMEN:  Soft and nontender. 

EXTREMITIES:  No clubbing or cyanosis.  No edema. 

SKIN:  Warm and dry. 

PERIPHERAL PULSES:  She has good popliteal pulses bilaterally, but pedal pulses are

diminished. 



LABORATORY DATA:  The patient's creatinine was 1.14 on admission, 1.36 yesterday,

1.06 today. 



Echocardiogram revealed left ventricular hypertrophy, moderate to severe annular

calcification, moderate to severe mitral regurgitation.  Pulmonary hypertension is

present. 



The patient is ready to go home from a surgical standpoint.  She had a dog bite.

She required some surgical care as is outlined in the chart. 



ASSESSMENT:  

1. History of coronary artery disease.

2. Peripheral vascular disease.

3. Continued smoking.

4. Hypertension, uncontrolled.

5. Previous stroke.

6. Intolerance to aspirin or Plavix, bleeding she says.

7. Renal insufficiency, improved.



PLAN:  

1. Add lisinopril 5 mg a day.

2. I told her she needs to either followup with us or Dr. Matute.

3. Continue other medicines unchanged.

4. Statin.

5. Reinforced importance of not smoking.

6. Would not be a candidate for further stenting if she cannot tolerate any

anti-platelet drugs.  Long-term prognosis is guarded. 







Job ID:  [id number]

## 2019-11-27 ENCOUNTER — HOSPITAL ENCOUNTER (EMERGENCY)
Dept: HOSPITAL 92 - ERS | Age: 64
Discharge: HOME | End: 2019-11-27
Payer: MEDICARE

## 2019-11-27 DIAGNOSIS — F17.210: ICD-10-CM

## 2019-11-27 DIAGNOSIS — I10: ICD-10-CM

## 2019-11-27 DIAGNOSIS — I95.9: Primary | ICD-10-CM

## 2019-11-27 LAB
ALBUMIN SERPL BCG-MCNC: 3.8 G/DL (ref 3.4–4.8)
ALP SERPL-CCNC: 109 U/L (ref 40–110)
ALT SERPL W P-5'-P-CCNC: 17 U/L (ref 8–55)
ANION GAP SERPL CALC-SCNC: 14 MMOL/L (ref 10–20)
AST SERPL-CCNC: 17 U/L (ref 5–34)
BASOPHILS # BLD AUTO: 0 THOU/UL (ref 0–0.2)
BASOPHILS NFR BLD AUTO: 0.3 % (ref 0–1)
BILIRUB SERPL-MCNC: 0.5 MG/DL (ref 0.2–1.2)
BUN SERPL-MCNC: 9 MG/DL (ref 9.8–20.1)
CALCIUM SERPL-MCNC: 8.5 MG/DL (ref 7.8–10.44)
CHLORIDE SERPL-SCNC: 107 MMOL/L (ref 98–107)
CO2 SERPL-SCNC: 23 MMOL/L (ref 23–31)
CREAT CL PREDICTED SERPL C-G-VRATE: 0 ML/MIN (ref 70–130)
EOSINOPHIL # BLD AUTO: 0.2 THOU/UL (ref 0–0.7)
EOSINOPHIL NFR BLD AUTO: 3.7 % (ref 0–10)
GLOBULIN SER CALC-MCNC: 2.4 G/DL (ref 2.4–3.5)
GLUCOSE SERPL-MCNC: 87 MG/DL (ref 80–115)
HGB BLD-MCNC: 15.7 G/DL (ref 12–16)
LIPASE SERPL-CCNC: 42 U/L (ref 8–78)
LYMPHOCYTES # BLD: 1.9 THOU/UL (ref 1.2–3.4)
LYMPHOCYTES NFR BLD AUTO: 31.1 % (ref 21–51)
MCH RBC QN AUTO: 30.6 PG (ref 27–31)
MCV RBC AUTO: 93.5 FL (ref 78–98)
MONOCYTES # BLD AUTO: 0.5 THOU/UL (ref 0.11–0.59)
MONOCYTES NFR BLD AUTO: 8.3 % (ref 0–10)
NEUTROPHILS # BLD AUTO: 3.4 THOU/UL (ref 1.4–6.5)
NEUTROPHILS NFR BLD AUTO: 56.5 % (ref 42–75)
PLATELET # BLD AUTO: 138 THOU/UL (ref 130–400)
POTASSIUM SERPL-SCNC: 5.1 MMOL/L (ref 3.5–5.1)
RBC # BLD AUTO: 5.14 MILL/UL (ref 4.2–5.4)
SODIUM SERPL-SCNC: 139 MMOL/L (ref 136–145)
WBC # BLD AUTO: 5.9 THOU/UL (ref 4.8–10.8)

## 2019-11-27 PROCEDURE — 93005 ELECTROCARDIOGRAM TRACING: CPT

## 2019-11-27 PROCEDURE — 83690 ASSAY OF LIPASE: CPT

## 2019-11-27 PROCEDURE — 96361 HYDRATE IV INFUSION ADD-ON: CPT

## 2019-11-27 PROCEDURE — 96360 HYDRATION IV INFUSION INIT: CPT

## 2019-11-27 PROCEDURE — 80053 COMPREHEN METABOLIC PANEL: CPT

## 2019-11-27 PROCEDURE — 36415 COLL VENOUS BLD VENIPUNCTURE: CPT

## 2019-11-27 PROCEDURE — 85025 COMPLETE CBC W/AUTO DIFF WBC: CPT

## 2019-11-27 PROCEDURE — 84484 ASSAY OF TROPONIN QUANT: CPT

## 2020-10-31 ENCOUNTER — HOSPITAL ENCOUNTER (EMERGENCY)
Dept: HOSPITAL 9 - MADERS | Age: 65
Discharge: ANOTHER HOSPITAL | End: 2020-10-31
Payer: MEDICARE

## 2020-10-31 DIAGNOSIS — F17.210: ICD-10-CM

## 2020-10-31 DIAGNOSIS — I50.9: ICD-10-CM

## 2020-10-31 DIAGNOSIS — I11.0: Primary | ICD-10-CM

## 2020-10-31 DIAGNOSIS — Z79.899: ICD-10-CM

## 2020-10-31 DIAGNOSIS — R77.8: ICD-10-CM

## 2020-10-31 DIAGNOSIS — K21.9: ICD-10-CM

## 2020-10-31 DIAGNOSIS — Z86.73: ICD-10-CM

## 2020-10-31 LAB
ALBUMIN SERPL BCG-MCNC: 3.8 G/DL (ref 3.4–4.8)
ALP SERPL-CCNC: 96 U/L (ref 40–110)
ALT SERPL W P-5'-P-CCNC: 30 U/L (ref 8–55)
ANION GAP SERPL CALC-SCNC: 15 MMOL/L (ref 10–20)
AST SERPL-CCNC: 32 U/L (ref 5–34)
BASOPHILS # BLD AUTO: 0.1 THOU/UL (ref 0–0.2)
BASOPHILS NFR BLD AUTO: 1.2 % (ref 0–1)
BILIRUB SERPL-MCNC: 0.4 MG/DL (ref 0.2–1.2)
BUN SERPL-MCNC: 21 MG/DL (ref 9.8–20.1)
CALCIUM SERPL-MCNC: 9 MG/DL (ref 7.8–10.44)
CHLORIDE SERPL-SCNC: 103 MMOL/L (ref 98–107)
CK MB SERPL-MCNC: 4.7 NG/ML (ref 0–6.6)
CO2 SERPL-SCNC: 29 MMOL/L (ref 23–31)
CREAT CL PREDICTED SERPL C-G-VRATE: 0 ML/MIN (ref 70–130)
EOSINOPHIL # BLD AUTO: 0.2 THOU/UL (ref 0–0.7)
EOSINOPHIL NFR BLD AUTO: 3.8 % (ref 0–10)
GLOBULIN SER CALC-MCNC: 2.6 G/DL (ref 2.4–3.5)
GLUCOSE SERPL-MCNC: 68 MG/DL (ref 80–115)
HGB BLD-MCNC: 15 G/DL (ref 12–16)
LYMPHOCYTES # BLD AUTO: 1.3 THOU/UL (ref 1.2–3.4)
LYMPHOCYTES NFR BLD AUTO: 23.3 % (ref 21–51)
MCH RBC QN AUTO: 29.7 PG (ref 27–31)
MCV RBC AUTO: 93.4 FL (ref 78–98)
MONOCYTES # BLD AUTO: 0.5 THOU/UL (ref 0.11–0.59)
MONOCYTES NFR BLD AUTO: 7.9 % (ref 0–10)
NEUTROPHILS # BLD AUTO: 3.7 THOU/UL (ref 1.4–6.5)
NEUTROPHILS NFR BLD AUTO: 63.8 % (ref 42–75)
PLATELET # BLD AUTO: 177 THOU/UL (ref 130–400)
POTASSIUM SERPL-SCNC: 4.5 MMOL/L (ref 3.5–5.1)
RBC # BLD AUTO: 5.04 MILL/UL (ref 4.2–5.4)
SODIUM SERPL-SCNC: 142 MMOL/L (ref 136–145)
WBC # BLD AUTO: 5.7 THOU/UL (ref 4.8–10.8)

## 2020-10-31 PROCEDURE — 84484 ASSAY OF TROPONIN QUANT: CPT

## 2020-10-31 PROCEDURE — 93005 ELECTROCARDIOGRAM TRACING: CPT

## 2020-10-31 PROCEDURE — 85025 COMPLETE CBC W/AUTO DIFF WBC: CPT

## 2020-10-31 PROCEDURE — 96374 THER/PROPH/DIAG INJ IV PUSH: CPT

## 2020-10-31 PROCEDURE — 80053 COMPREHEN METABOLIC PANEL: CPT

## 2020-10-31 PROCEDURE — 83880 ASSAY OF NATRIURETIC PEPTIDE: CPT

## 2020-10-31 PROCEDURE — 82553 CREATINE MB FRACTION: CPT

## 2020-10-31 PROCEDURE — 71046 X-RAY EXAM CHEST 2 VIEWS: CPT

## 2020-10-31 NOTE — RAD
EXAM:

CHEST TWO VIEWS



10/31/2020 8:26 PM



HISTORY:

65-year-old female with shortness of breath



COMPARISON:

Prior exam dated July 22, 2019



FINDINGS:



Lungs:  There is interstitial opacities involving both lower lobes.



Heart:  There is mild cardiomegaly 



Pulmonary Vessels: There is mild pulmonary vascular congestion



Costophrenic Angles: There are small bilateral pleural effusions



Pneumothorax: None.



Osseous Structures:  Intact.



Additional Findings:   None.



IMPRESSION:

Findings suspicious for mild CHF 



Reported By: Kirk Gay 

Electronically Signed:  10/31/2020 8:27 PM

## 2020-11-01 ENCOUNTER — HOSPITAL ENCOUNTER (OUTPATIENT)
Dept: HOSPITAL 92 - ERS | Age: 65
Setting detail: OBSERVATION
Discharge: HOME | End: 2020-11-01
Attending: INTERNAL MEDICINE | Admitting: INTERNAL MEDICINE
Payer: MEDICARE

## 2020-11-01 VITALS — DIASTOLIC BLOOD PRESSURE: 74 MMHG | SYSTOLIC BLOOD PRESSURE: 145 MMHG

## 2020-11-01 DIAGNOSIS — I50.9: ICD-10-CM

## 2020-11-01 DIAGNOSIS — Z79.82: ICD-10-CM

## 2020-11-01 DIAGNOSIS — Z86.79: ICD-10-CM

## 2020-11-01 DIAGNOSIS — Z88.1: ICD-10-CM

## 2020-11-01 DIAGNOSIS — I11.0: Primary | ICD-10-CM

## 2020-11-01 DIAGNOSIS — Z88.0: ICD-10-CM

## 2020-11-01 DIAGNOSIS — I25.10: ICD-10-CM

## 2020-11-01 DIAGNOSIS — Z86.73: ICD-10-CM

## 2020-11-01 DIAGNOSIS — Z20.828: ICD-10-CM

## 2020-11-01 DIAGNOSIS — Z95.5: ICD-10-CM

## 2020-11-01 DIAGNOSIS — E78.5: ICD-10-CM

## 2020-11-01 DIAGNOSIS — F17.210: ICD-10-CM

## 2020-11-01 DIAGNOSIS — Z79.899: ICD-10-CM

## 2020-11-01 DIAGNOSIS — N17.9: ICD-10-CM

## 2020-11-01 LAB
ALBUMIN SERPL BCG-MCNC: 3.5 G/DL (ref 3.4–4.8)
ALP SERPL-CCNC: 92 U/L (ref 40–110)
ALT SERPL W P-5'-P-CCNC: 23 U/L (ref 8–55)
ANION GAP SERPL CALC-SCNC: 15 MMOL/L (ref 10–20)
AST SERPL-CCNC: 24 U/L (ref 5–34)
BASOPHILS # BLD AUTO: 0.1 THOU/UL (ref 0–0.2)
BASOPHILS NFR BLD AUTO: 1 % (ref 0–1)
BILIRUB SERPL-MCNC: 0.4 MG/DL (ref 0.2–1.2)
BUN SERPL-MCNC: 21 MG/DL (ref 9.8–20.1)
CALCIUM SERPL-MCNC: 8.9 MG/DL (ref 7.8–10.44)
CHD RISK SERPL-RTO: 3 (ref ?–4.5)
CHLORIDE SERPL-SCNC: 103 MMOL/L (ref 98–107)
CHOLEST SERPL-MCNC: 117 MG/DL
CO2 SERPL-SCNC: 29 MMOL/L (ref 23–31)
CREAT CL PREDICTED SERPL C-G-VRATE: 0 ML/MIN (ref 70–130)
EOSINOPHIL # BLD AUTO: 0.3 THOU/UL (ref 0–0.7)
EOSINOPHIL NFR BLD AUTO: 5.5 % (ref 0–10)
GLOBULIN SER CALC-MCNC: 2.7 G/DL (ref 2.4–3.5)
GLUCOSE SERPL-MCNC: 90 MG/DL (ref 80–115)
HDLC SERPL-MCNC: 39 MG/DL
HGB BLD-MCNC: 14.4 G/DL (ref 12–16)
LDLC SERPL CALC-MCNC: 40 MG/DL
LYMPHOCYTES # BLD: 2 THOU/UL (ref 1.2–3.4)
LYMPHOCYTES NFR BLD AUTO: 34.6 % (ref 21–51)
MAGNESIUM SERPL-MCNC: 1.8 MG/DL (ref 1.6–2.6)
MCH RBC QN AUTO: 31.1 PG (ref 27–31)
MCV RBC AUTO: 92.3 FL (ref 78–98)
MONOCYTES # BLD AUTO: 0.6 THOU/UL (ref 0.11–0.59)
MONOCYTES NFR BLD AUTO: 10.4 % (ref 0–10)
NEUTROPHILS # BLD AUTO: 2.8 THOU/UL (ref 1.4–6.5)
NEUTROPHILS NFR BLD AUTO: 48.4 % (ref 42–75)
PLATELET # BLD AUTO: 198 THOU/UL (ref 130–400)
POTASSIUM SERPL-SCNC: 4.1 MMOL/L (ref 3.5–5.1)
RBC # BLD AUTO: 4.63 MILL/UL (ref 4.2–5.4)
SODIUM SERPL-SCNC: 143 MMOL/L (ref 136–145)
TRIGL SERPL-MCNC: 190 MG/DL (ref ?–150)
TROPONIN I SERPL DL<=0.01 NG/ML-MCNC: 0.03 NG/ML (ref ?–0.03)
TROPONIN I SERPL DL<=0.01 NG/ML-MCNC: 0.06 NG/ML (ref ?–0.03)
WBC # BLD AUTO: 5.8 THOU/UL (ref 4.8–10.8)

## 2020-11-01 PROCEDURE — U0003 INFECTIOUS AGENT DETECTION BY NUCLEIC ACID (DNA OR RNA); SEVERE ACUTE RESPIRATORY SYNDROME CORONAVIRUS 2 (SARS-COV-2) (CORONAVIRUS DISEASE [COVID-19]), AMPLIFIED PROBE TECHNIQUE, MAKING USE OF HIGH THROUGHPUT TECHNOLOGIES AS DESCRIBED BY CMS-2020-01-R: HCPCS

## 2020-11-01 PROCEDURE — 84484 ASSAY OF TROPONIN QUANT: CPT

## 2020-11-01 PROCEDURE — 83735 ASSAY OF MAGNESIUM: CPT

## 2020-11-01 PROCEDURE — 87635 SARS-COV-2 COVID-19 AMP PRB: CPT

## 2020-11-01 PROCEDURE — 80061 LIPID PANEL: CPT

## 2020-11-01 PROCEDURE — 93005 ELECTROCARDIOGRAM TRACING: CPT

## 2020-11-01 PROCEDURE — 83036 HEMOGLOBIN GLYCOSYLATED A1C: CPT

## 2020-11-01 PROCEDURE — 85025 COMPLETE CBC W/AUTO DIFF WBC: CPT

## 2020-11-01 PROCEDURE — 80053 COMPREHEN METABOLIC PANEL: CPT

## 2020-11-01 PROCEDURE — 36415 COLL VENOUS BLD VENIPUNCTURE: CPT

## 2020-11-01 PROCEDURE — 99285 EMERGENCY DEPT VISIT HI MDM: CPT

## 2020-11-01 PROCEDURE — 93306 TTE W/DOPPLER COMPLETE: CPT

## 2020-11-01 PROCEDURE — 84443 ASSAY THYROID STIM HORMONE: CPT

## 2020-11-01 NOTE — PDOC.HHP
Hospitalist HPI





- History of Present Illness


dyspnea 


History of Present Illness: 


Case of an 64y/o female with pmhx of cad stent x5 last one over 2y/a, htn and hx

of cva with residual R sided weakness and speech disturbance transferred from 

the [place] [facility] for new onset CHF and elevated troponin. patient refers she 

has been having progressive dyspnea since 1 month ago but has gotten 

considerably worse in the last week, she reports orhtopnea, PND and BAY patient 

was also reports some dizziness and elevated blood pressure.  she denies any 

chest pain,  palpitation or diaphoresis.





Hospitalist ROS





- Review of Systems


All other systems reviewed; all pertinent +/- noted in HPI/Subj





Hospitalist History





- Past Surgical History


Past Surgical History: reports: Appendectomy, Hysterectomy





- Family History


Family History: reports: cardiac disorder





- Social History


Smoking Status: Current every day smoker


Alcohol: reports: None


Drugs: reports: none





- Exam


General Appearance: NAD, awake alert


Eye: PERRL, anicteric sclera


ENT: normocephalic atraumatic, no oropharyngeal lesions


Neck: supple, symmetric, no thyromegaly


Heart: RRR, no murmur, no gallops


Respiratory: no wheezes, no ronchi, normal chest expansion, no tachypnea, rales


Gastrointestinal: soft, non-tender, non-distended


Extremities: no cyanosis, no clubbing, no edema


Skin: normal turgor, no lesions, no rashes


Neurological: cranial nerve grossly intact, normal sensation to touch, no 

weakness


Musculoskeletal: normal tone, normal strength, no muscle wasting


Psychiatric: normal affect, normal behavior, A&O x 3





Hospitalist H&P A/P





- Problem


(1) Heart failure


Code(s): I50.9 - HEART FAILURE, UNSPECIFIED   Status: Acute   





(2) CAD (coronary artery disease)


Code(s): I25.10 - ATHSCL HEART DISEASE OF NATIVE CORONARY ARTERY W/O ANG PCTRS  

Status: Acute   





(3) History of CVA (cerebrovascular accident)


Code(s): Z86.73 - PRSNL HX OF TIA (TIA), AND CEREB INFRC W/O RESID DEFICITS   

Status: Acute   





(4) HTN (hypertension)


Code(s): I10 - ESSENTIAL (PRIMARY) HYPERTENSION   Status: Acute   





(5) Smoker


Code(s): F17.200 - NICOTINE DEPENDENCE, UNSPECIFIED, UNCOMPLICATED   Status: 

Acute   





- Plan


Plan: 


Case of an 64y/o female with the stated pmhx who present with new onset of heart

failure





heart failure


- cxr with vascular congestion


- pro bnp in the 1900s


- continue with beta blocker and acei


- lasix 20mg ivq q 12, lasix naive


- 2d echo


- cardio evaluation


- elevated troponin will trend, likely due to demand ischemia


- cardiac monitoring


- ekg w/o st changes





htn


- continue home meds


- adjust as necessary





hx of cva


- continue asa + statin for secondary prevention





cad


- on optimal medical tx with bb acei and stain





smoker


- advice to quit

## 2020-11-01 NOTE — PDOC.HOSPP
- Subjective


Encounter Date: 11/01/20


Subjective: 


No new issues. Feels better. SOB improved. 





- Objective


Vital Signs & Weight: 


                             Vital Signs (12 hours)











  Pulse BP


 


 11/01/20 11:42  66  145/74 H











Result Diagrams: 


                                 11/01/20 04:34





                                 11/01/20 04:34





Hospitalist ROS





- Review of Systems


Constitutional: denies: fever, chills, sweats, weakness, malaise, other


Eyes: denies: pain, vision change, conjunctivae inflammation, eyelid 

inflammation, redness, other


ENT: denies: ear pain, ear discharge, nose pain, nose discharge, nose 

congestion, mouth pain, mouth swelling, throat pain, throat swelling, other


Respiratory: reports: shortness of breath.  denies: cough, dry, hemoptysis, SOB 

with excertion, pleuritic pain, sputum, wheezing, other


Cardiovascular: denies: chest pain, palpitations, orthopnea, paroxysmal noc. 

dyspnea, edema, light headedness, other


Gastrointestinal: denies: nausea, vomiting, abdominal pain, diarrhea, 

constipation, melena, hematochezia, other


Genitourinary: denies: dysuria, frequency, incontinence, hematuria, retention, 

other


Musculoskeletal: denies: neck pain, shoulder pain, arm pain, back pain, hand 

pain, leg pain, foot pain, other


Skin: denies: rash, lesions, shara, bruising, other


Neurological: denies: weakness, numbness, incoordination, change in speech, 

confusion, seizures, other





- Medication


Medications: 


Active Medications











Generic Name Dose Route Start Last Admin





  Trade Name Freq  PRN Reason Stop Dose Admin


 


Aspirin  81 mg  11/01/20 09:00  11/01/20 11:41





  Aspirin 81 Mg Enteric Coated Tablet  PO   81 mg





  DAILY AURY   Administration


 


Enoxaparin Sodium  40 mg  11/01/20 09:00  11/01/20 11:41





  Enoxaparin Sodium 40 Mg/0.4 Ml Syringe  SC   40 mg





  0900 AURY   Administration


 


Furosemide  20 mg  11/01/20 06:00  11/01/20 06:07





  Furosemide 20 Mg/2 Ml Vial  SLOW IVP   20 mg





  0600,1400 AURY   Administration


 


Lisinopril  5 mg  11/01/20 09:00  11/01/20 11:42





  Lisinopril 5 Mg Tab  PO   5 mg





  DAILY AURY   Administration


 


Metoprolol Succinate  50 mg  11/01/20 09:00  11/01/20 11:42





  Metoprolol Succinate Xl 50 Mg Tab  PO   50 mg





  DAILY AURY   Administration














- Exam


General Appearance: NAD, awake alert


Eye: PERRL, anicteric sclera


ENT: normocephalic atraumatic, no oropharyngeal lesions


Neck: supple, symmetric, no JVD, no thyromegaly


Heart: RRR, no murmur, no gallops, no rubs


Respiratory: CTAB, no wheezes, no rales, no ronchi


Gastrointestinal: soft, non-tender, non-distended, normal bowel sounds


Extremities: no cyanosis, no clubbing, no edema


Skin: normal turgor, no lesions, no rashes


Neurological: cranial nerve grossly intact, no focal deficits


Musculoskeletal: normal strength, no muscle wasting


Psychiatric: normal affect, normal behavior, A&O x 3





Hosp A/P


(1) CAD (coronary artery disease)


Code(s): I25.10 - ATHSCL HEART DISEASE OF NATIVE CORONARY ARTERY W/O ANG PCTRS  

Status: Acute   


Plan: 


Mildly elevated troponins, may be due to Type 2 MI. Will await cardiac eval and 

further recs. 








(2) HTN (hypertension)


Code(s): I10 - ESSENTIAL (PRIMARY) HYPERTENSION   Status: Acute   


Qualifiers: 


   Hypertension type: essential hypertension   Qualified Code(s): I10 - 

Essential (primary) hypertension   


Plan: 


Controlled, cont current BP meds. 








(3) Heart failure


Code(s): I50.9 - HEART FAILURE, UNSPECIFIED   Status: Acute   


Plan: 


Awaiting Echo results. Improved w Lasix so far. Ciont med mgt. 








(4) History of CVA (cerebrovascular accident)


Code(s): Z86.73 - PRSNL HX OF TIA (TIA), AND CEREB INFRC W/O RESID DEFICITS   

Status: Acute   


Plan: 


Stable, no new issues. Cont med mgt. 








(5) Smoker


Code(s): F17.200 - NICOTINE DEPENDENCE, UNSPECIFIED, UNCOMPLICATED   Status: 

Acute   


Plan: 


Has been counseled. 








(6) GILMAR (acute kidney injury)


Code(s): N17.9 - ACUTE KIDNEY FAILURE, UNSPECIFIED   Status: Acute   


Plan: 


Slight elevation in Cr. Monitor for now. 








- Plan


DVT proph w/heparin


PPx: SCDs.





CODE: FULL.





Dispo: Cont current mgt. Await cardiac eval and Echo results.

## 2020-11-01 NOTE — CON
DATE OF CONSULTATION:  



REFERRING DOCTOR:  Dr. John Arita.



HISTORY OF PRESENT ILLNESS:  The patient is a 65-year-old woman who presents for
evaluation of dyspnea.  The patient has a long history of coronary artery 
disease.

She has previously undergone several PCIs and stent placements.  The patient 
also has peripheral vascular disease and has undergone stent placement to her 
right

extremity.  The patient presented dyspneic and she was noted to be markedly 
hypertensive.  The patient denied having any chest discomfort.  The patient 
denies

having any headaches.  The patient states she has been compliant with her 
medications.  The patient denies having any PND or orthopnea. 



PAST MEDICAL HISTORY:  

1. Coronary artery disease.

2. Hypertension.

3. Dyslipidemia.

4. Cerebrovascular accident.



PAST SURGICAL HISTORY:  Appendectomy, and hysterectomy.



SOCIAL HISTORY:  Long history of tobacco abuse.



FAMILY HISTORY:  Positive for family history of coronary artery disease.



MEDICATIONS:  See nursing list.



ALLERGIES:  DOXYCYCLINE, PENICILLIN.



PHYSICAL EXAMINATION:

GENERAL:  This is a well-developed woman, in no acute distress. 

VITAL SIGNS:  With a blood pressure of 145/74. 

NECK:  No jugular venous distention. 

LUNGS:  Clear to auscultation. 

HEART:  Regular rate and rhythm.  Normal S1, S2, with a 2/6 systolic murmur. 

ABDOMEN:  Nondistended. 

EXTREMITIES:  Showed no edema.  There are diminished pulses bilateral.



LABORATORY RESULTS:  White blood cell count of 5.8, hemoglobin 14.4,

hematocrit 42.7, platelets were 198.  Sodium is 143, potassium 4.1, chloride 
103,

bicarbonate 29, BUN 21, creatinine is 2.14.  Triglycerides were 190, LDL 40, HDL
39.

EKG  normal sinus rhythm with left ventricular hypertrophy. 



IMPRESSION:  

1. Malignant hypertension.

2. History of cerebrovascular accident.

3. History of coronary artery disease.

4. History of peripheral vascular disease.

5. Dyslipidemia.   



This patient presents with hypertensive crisis.  She states she has been 
compliant with her medications.  From a cardiac standpoint, her echocardiogram 
reveals severe

left ventricular hypertrophy.  It is imperative this patient have improved 
control of hypertension.  We will follow this patient with you through her 
hospitalization.









Job ID:  [id number]



MTDD

## 2021-03-10 ENCOUNTER — HOSPITAL ENCOUNTER (EMERGENCY)
Dept: HOSPITAL 92 - ERS | Age: 66
Discharge: HOME | End: 2021-03-10
Payer: MEDICARE

## 2021-03-10 DIAGNOSIS — I73.9: Primary | ICD-10-CM

## 2021-03-10 DIAGNOSIS — M79.604: ICD-10-CM

## 2021-03-10 DIAGNOSIS — Z79.899: ICD-10-CM

## 2021-03-10 DIAGNOSIS — I10: ICD-10-CM

## 2021-03-10 DIAGNOSIS — F17.210: ICD-10-CM

## 2021-03-10 DIAGNOSIS — K21.9: ICD-10-CM

## 2021-03-10 LAB
ALBUMIN SERPL BCG-MCNC: 3.8 G/DL (ref 3.4–4.8)
ALP SERPL-CCNC: 103 U/L (ref 40–110)
ALT SERPL W P-5'-P-CCNC: 15 U/L (ref 8–55)
ANION GAP SERPL CALC-SCNC: 16 MMOL/L (ref 10–20)
AST SERPL-CCNC: 20 U/L (ref 5–34)
BASOPHILS # BLD AUTO: 0.1 THOU/UL (ref 0–0.2)
BASOPHILS NFR BLD AUTO: 0.9 % (ref 0–1)
BILIRUB SERPL-MCNC: 0.5 MG/DL (ref 0.2–1.2)
BUN SERPL-MCNC: 26 MG/DL (ref 9.8–20.1)
CALCIUM SERPL-MCNC: 9.3 MG/DL (ref 7.8–10.44)
CHLORIDE SERPL-SCNC: 105 MMOL/L (ref 98–107)
CO2 SERPL-SCNC: 25 MMOL/L (ref 23–31)
CREAT CL PREDICTED SERPL C-G-VRATE: 0 ML/MIN (ref 70–130)
EOSINOPHIL # BLD AUTO: 0.2 THOU/UL (ref 0–0.7)
EOSINOPHIL NFR BLD AUTO: 3.5 % (ref 0–10)
GLOBULIN SER CALC-MCNC: 2.4 G/DL (ref 2.4–3.5)
GLUCOSE SERPL-MCNC: 97 MG/DL (ref 80–115)
HGB BLD-MCNC: 15.5 G/DL (ref 12–16)
LYMPHOCYTES # BLD: 1.7 THOU/UL (ref 1.2–3.4)
LYMPHOCYTES NFR BLD AUTO: 25.7 % (ref 21–51)
MCH RBC QN AUTO: 30.1 PG (ref 27–31)
MCV RBC AUTO: 92.1 FL (ref 78–98)
MONOCYTES # BLD AUTO: 0.7 THOU/UL (ref 0.11–0.59)
MONOCYTES NFR BLD AUTO: 10 % (ref 0–10)
NEUTROPHILS # BLD AUTO: 4 THOU/UL (ref 1.4–6.5)
NEUTROPHILS NFR BLD AUTO: 59.8 % (ref 42–75)
PLATELET # BLD AUTO: 165 THOU/UL (ref 130–400)
POTASSIUM SERPL-SCNC: 4 MMOL/L (ref 3.5–5.1)
RBC # BLD AUTO: 5.16 MILL/UL (ref 4.2–5.4)
SODIUM SERPL-SCNC: 142 MMOL/L (ref 136–145)
WBC # BLD AUTO: 6.7 THOU/UL (ref 4.8–10.8)

## 2021-03-10 PROCEDURE — 36415 COLL VENOUS BLD VENIPUNCTURE: CPT

## 2021-03-10 PROCEDURE — 85025 COMPLETE CBC W/AUTO DIFF WBC: CPT

## 2021-03-10 PROCEDURE — 80053 COMPREHEN METABOLIC PANEL: CPT

## 2021-03-10 PROCEDURE — 86140 C-REACTIVE PROTEIN: CPT

## 2021-03-10 PROCEDURE — 85652 RBC SED RATE AUTOMATED: CPT

## 2021-03-26 ENCOUNTER — HOSPITAL ENCOUNTER (EMERGENCY)
Dept: HOSPITAL 9 - MADERS | Age: 66
Discharge: HOME | End: 2021-03-26
Payer: MEDICARE

## 2021-03-26 DIAGNOSIS — F17.210: ICD-10-CM

## 2021-03-26 DIAGNOSIS — Z86.73: ICD-10-CM

## 2021-03-26 DIAGNOSIS — K04.7: Primary | ICD-10-CM

## 2021-03-26 DIAGNOSIS — Z79.899: ICD-10-CM

## 2021-03-26 DIAGNOSIS — I10: ICD-10-CM

## 2021-03-26 DIAGNOSIS — L03.211: ICD-10-CM

## 2021-03-26 DIAGNOSIS — K21.9: ICD-10-CM

## 2021-03-26 PROCEDURE — 64400 NJX AA&/STRD TRIGEMINAL NRV: CPT

## 2021-05-20 ENCOUNTER — HOSPITAL ENCOUNTER (INPATIENT)
Dept: HOSPITAL 92 - ERS | Age: 66
LOS: 7 days | Discharge: HOME | DRG: 314 | End: 2021-05-27
Attending: FAMILY MEDICINE | Admitting: INTERNAL MEDICINE
Payer: MEDICARE

## 2021-05-20 VITALS — BODY MASS INDEX: 19.1 KG/M2

## 2021-05-20 DIAGNOSIS — Z79.899: ICD-10-CM

## 2021-05-20 DIAGNOSIS — Z20.822: ICD-10-CM

## 2021-05-20 DIAGNOSIS — I25.10: ICD-10-CM

## 2021-05-20 DIAGNOSIS — F15.10: ICD-10-CM

## 2021-05-20 DIAGNOSIS — I42.1: Primary | ICD-10-CM

## 2021-05-20 DIAGNOSIS — G45.9: ICD-10-CM

## 2021-05-20 DIAGNOSIS — N18.30: ICD-10-CM

## 2021-05-20 DIAGNOSIS — Z95.5: ICD-10-CM

## 2021-05-20 DIAGNOSIS — Z88.1: ICD-10-CM

## 2021-05-20 DIAGNOSIS — F17.200: ICD-10-CM

## 2021-05-20 DIAGNOSIS — N17.9: ICD-10-CM

## 2021-05-20 DIAGNOSIS — I50.32: ICD-10-CM

## 2021-05-20 DIAGNOSIS — Z71.51: ICD-10-CM

## 2021-05-20 DIAGNOSIS — G92: ICD-10-CM

## 2021-05-20 DIAGNOSIS — I13.0: ICD-10-CM

## 2021-05-20 DIAGNOSIS — N39.0: ICD-10-CM

## 2021-05-20 DIAGNOSIS — I95.1: ICD-10-CM

## 2021-05-20 DIAGNOSIS — R77.8: ICD-10-CM

## 2021-05-20 DIAGNOSIS — I69.351: ICD-10-CM

## 2021-05-20 DIAGNOSIS — E78.5: ICD-10-CM

## 2021-05-20 DIAGNOSIS — Z88.0: ICD-10-CM

## 2021-05-20 DIAGNOSIS — T43.625A: ICD-10-CM

## 2021-05-20 PROCEDURE — 84484 ASSAY OF TROPONIN QUANT: CPT

## 2021-05-20 PROCEDURE — 93306 TTE W/DOPPLER COMPLETE: CPT

## 2021-05-20 PROCEDURE — 80048 BASIC METABOLIC PNL TOTAL CA: CPT

## 2021-05-20 PROCEDURE — 80053 COMPREHEN METABOLIC PANEL: CPT

## 2021-05-20 PROCEDURE — 81003 URINALYSIS AUTO W/O SCOPE: CPT

## 2021-05-20 PROCEDURE — 80306 DRUG TEST PRSMV INSTRMNT: CPT

## 2021-05-20 PROCEDURE — 93005 ELECTROCARDIOGRAM TRACING: CPT

## 2021-05-20 PROCEDURE — 82550 ASSAY OF CK (CPK): CPT

## 2021-05-20 PROCEDURE — 84443 ASSAY THYROID STIM HORMONE: CPT

## 2021-05-20 PROCEDURE — 80307 DRUG TEST PRSMV CHEM ANLYZR: CPT

## 2021-05-20 PROCEDURE — 70450 CT HEAD/BRAIN W/O DYE: CPT

## 2021-05-20 PROCEDURE — 74018 RADEX ABDOMEN 1 VIEW: CPT

## 2021-05-20 PROCEDURE — 36415 COLL VENOUS BLD VENIPUNCTURE: CPT

## 2021-05-20 PROCEDURE — 87086 URINE CULTURE/COLONY COUNT: CPT

## 2021-05-20 PROCEDURE — G0378 HOSPITAL OBSERVATION PER HR: HCPCS

## 2021-05-20 PROCEDURE — 81015 MICROSCOPIC EXAM OF URINE: CPT

## 2021-05-20 PROCEDURE — 95819 EEG AWAKE AND ASLEEP: CPT

## 2021-05-20 PROCEDURE — 96376 TX/PRO/DX INJ SAME DRUG ADON: CPT

## 2021-05-20 PROCEDURE — 96374 THER/PROPH/DIAG INJ IV PUSH: CPT

## 2021-05-20 PROCEDURE — 96375 TX/PRO/DX INJ NEW DRUG ADDON: CPT

## 2021-05-20 PROCEDURE — 82553 CREATINE MB FRACTION: CPT

## 2021-05-20 PROCEDURE — 96372 THER/PROPH/DIAG INJ SC/IM: CPT

## 2021-05-20 PROCEDURE — 93880 EXTRACRANIAL BILAT STUDY: CPT

## 2021-05-20 PROCEDURE — U0002 COVID-19 LAB TEST NON-CDC: HCPCS

## 2021-05-20 PROCEDURE — 80061 LIPID PANEL: CPT

## 2021-05-20 PROCEDURE — 85025 COMPLETE CBC W/AUTO DIFF WBC: CPT

## 2021-05-20 PROCEDURE — 70551 MRI BRAIN STEM W/O DYE: CPT

## 2021-05-20 PROCEDURE — 72125 CT NECK SPINE W/O DYE: CPT

## 2021-05-20 PROCEDURE — 82140 ASSAY OF AMMONIA: CPT

## 2021-05-20 PROCEDURE — 83605 ASSAY OF LACTIC ACID: CPT

## 2021-05-20 PROCEDURE — 95957 EEG DIGITAL ANALYSIS: CPT

## 2021-05-20 PROCEDURE — 95816 EEG AWAKE AND DROWSY: CPT

## 2021-05-20 PROCEDURE — 83036 HEMOGLOBIN GLYCOSYLATED A1C: CPT

## 2021-05-21 LAB
ALBUMIN SERPL BCG-MCNC: 3.6 G/DL (ref 3.4–4.8)
ALP SERPL-CCNC: 112 U/L (ref 40–110)
ALT SERPL W P-5'-P-CCNC: 15 U/L (ref 8–55)
ANION GAP SERPL CALC-SCNC: 16 MMOL/L (ref 10–20)
APAP SERPL-MCNC: (no result) MCG/ML (ref 10–30)
AST SERPL-CCNC: 19 U/L (ref 5–34)
BASOPHILS # BLD AUTO: 0 THOU/UL (ref 0–0.2)
BASOPHILS NFR BLD AUTO: 0.4 % (ref 0–1)
BILIRUB SERPL-MCNC: 0.5 MG/DL (ref 0.2–1.2)
BUN SERPL-MCNC: 32 MG/DL (ref 9.8–20.1)
CALCIUM SERPL-MCNC: 9 MG/DL (ref 7.8–10.44)
CHD RISK SERPL-RTO: 3 (ref ?–4.5)
CHLORIDE SERPL-SCNC: 106 MMOL/L (ref 98–107)
CHOLEST SERPL-MCNC: 155 MG/DL
CK MB SERPL-MCNC: 4.6 NG/ML (ref 0–6.6)
CO2 SERPL-SCNC: 23 MMOL/L (ref 23–31)
CREAT CL PREDICTED SERPL C-G-VRATE: 0 ML/MIN (ref 70–130)
DRUG SCREEN CUTOFF: (no result)
EOSINOPHIL # BLD AUTO: 0.2 THOU/UL (ref 0–0.7)
EOSINOPHIL NFR BLD AUTO: 2.5 % (ref 0–10)
GLOBULIN SER CALC-MCNC: 2.5 G/DL (ref 2.4–3.5)
GLUCOSE SERPL-MCNC: 94 MG/DL (ref 80–115)
HDLC SERPL-MCNC: 52 MG/DL
HGB BLD-MCNC: 14.5 G/DL (ref 12–16)
LDLC SERPL CALC-MCNC: 87 MG/DL
LEUKOCYTE ESTERASE UR QL STRIP.AUTO: 250 LEU/UL
LYMPHOCYTES # BLD: 1.2 THOU/UL (ref 1.2–3.4)
LYMPHOCYTES NFR BLD AUTO: 18.3 % (ref 21–51)
MCH RBC QN AUTO: 30.5 PG (ref 27–31)
MCV RBC AUTO: 92.1 FL (ref 78–98)
MEDTOX CONTROL LINE VALID?: (no result)
MEDTOX READER #: (no result)
MONOCYTES # BLD AUTO: 0.5 THOU/UL (ref 0.11–0.59)
MONOCYTES NFR BLD AUTO: 8.1 % (ref 0–10)
NEUTROPHILS # BLD AUTO: 4.6 THOU/UL (ref 1.4–6.5)
NEUTROPHILS NFR BLD AUTO: 70.7 % (ref 42–75)
PLATELET # BLD AUTO: 177 THOU/UL (ref 130–400)
POTASSIUM SERPL-SCNC: 4.1 MMOL/L (ref 3.5–5.1)
RBC # BLD AUTO: 4.76 MILL/UL (ref 4.2–5.4)
RBC UR QL AUTO: (no result) HPF (ref 0–3)
SALICYLATES SERPL-MCNC: (no result) MG/DL (ref 15–30)
SODIUM SERPL-SCNC: 141 MMOL/L (ref 136–145)
SP GR UR STRIP: 1.02 (ref 1–1.04)
TRIGL SERPL-MCNC: 79 MG/DL (ref ?–150)
TROPONIN I SERPL DL<=0.01 NG/ML-MCNC: 0.05 NG/ML (ref ?–0.03)
TROPONIN I SERPL DL<=0.01 NG/ML-MCNC: 0.05 NG/ML (ref ?–0.03)
WBC # BLD AUTO: 6.5 THOU/UL (ref 4.8–10.8)
WBC UR QL AUTO: (no result) HPF (ref 0–3)

## 2021-05-21 RX ADMIN — CEFTRIAXONE SCH MLS: 1 INJECTION, POWDER, FOR SOLUTION INTRAMUSCULAR; INTRAVENOUS at 06:23

## 2021-05-22 LAB
ANION GAP SERPL CALC-SCNC: 12 MMOL/L (ref 10–20)
BASOPHILS # BLD AUTO: 0 THOU/UL (ref 0–0.2)
BASOPHILS NFR BLD AUTO: 0.9 % (ref 0–1)
BUN SERPL-MCNC: 23 MG/DL (ref 9.8–20.1)
CALCIUM SERPL-MCNC: 8.5 MG/DL (ref 7.8–10.44)
CHLORIDE SERPL-SCNC: 110 MMOL/L (ref 98–107)
CO2 SERPL-SCNC: 24 MMOL/L (ref 23–31)
CREAT CL PREDICTED SERPL C-G-VRATE: 42 ML/MIN (ref 70–130)
EOSINOPHIL # BLD AUTO: 0.2 THOU/UL (ref 0–0.7)
EOSINOPHIL NFR BLD AUTO: 4.4 % (ref 0–10)
GLUCOSE SERPL-MCNC: 95 MG/DL (ref 80–115)
HGB BLD-MCNC: 14.7 G/DL (ref 12–16)
LYMPHOCYTES # BLD: 1.1 THOU/UL (ref 1.2–3.4)
LYMPHOCYTES NFR BLD AUTO: 23.6 % (ref 21–51)
MCH RBC QN AUTO: 30 PG (ref 27–31)
MCV RBC AUTO: 92.4 FL (ref 78–98)
MONOCYTES # BLD AUTO: 0.6 THOU/UL (ref 0.11–0.59)
MONOCYTES NFR BLD AUTO: 13.4 % (ref 0–10)
NEUTROPHILS # BLD AUTO: 2.8 THOU/UL (ref 1.4–6.5)
NEUTROPHILS NFR BLD AUTO: 57.7 % (ref 42–75)
PLATELET # BLD AUTO: 156 THOU/UL (ref 130–400)
POTASSIUM SERPL-SCNC: 4.1 MMOL/L (ref 3.5–5.1)
RBC # BLD AUTO: 4.89 MILL/UL (ref 4.2–5.4)
SODIUM SERPL-SCNC: 142 MMOL/L (ref 136–145)
WBC # BLD AUTO: 4.8 THOU/UL (ref 4.8–10.8)

## 2021-05-22 RX ADMIN — CEFTRIAXONE SCH MLS: 1 INJECTION, POWDER, FOR SOLUTION INTRAMUSCULAR; INTRAVENOUS at 06:16

## 2021-05-22 RX ADMIN — NIFEDIPINE SCH MG: 30 TABLET, FILM COATED, EXTENDED RELEASE ORAL at 09:17

## 2021-05-23 RX ADMIN — CEFTRIAXONE SCH MLS: 1 INJECTION, POWDER, FOR SOLUTION INTRAMUSCULAR; INTRAVENOUS at 05:28

## 2021-05-23 RX ADMIN — NIFEDIPINE SCH: 30 TABLET, FILM COATED, EXTENDED RELEASE ORAL at 09:46

## 2021-05-27 VITALS — DIASTOLIC BLOOD PRESSURE: 91 MMHG | TEMPERATURE: 97.8 F | SYSTOLIC BLOOD PRESSURE: 143 MMHG

## 2022-10-15 ENCOUNTER — HOSPITAL ENCOUNTER (INPATIENT)
Dept: HOSPITAL 92 - ERS | Age: 67
LOS: 2 days | Discharge: HOME HEALTH | DRG: 563 | End: 2022-10-17
Attending: FAMILY MEDICINE | Admitting: INTERNAL MEDICINE
Payer: MEDICARE

## 2022-10-15 VITALS — BODY MASS INDEX: 20.1 KG/M2

## 2022-10-15 DIAGNOSIS — N18.30: ICD-10-CM

## 2022-10-15 DIAGNOSIS — Z86.73: ICD-10-CM

## 2022-10-15 DIAGNOSIS — I25.10: ICD-10-CM

## 2022-10-15 DIAGNOSIS — N17.9: ICD-10-CM

## 2022-10-15 DIAGNOSIS — F17.210: ICD-10-CM

## 2022-10-15 DIAGNOSIS — I73.9: ICD-10-CM

## 2022-10-15 DIAGNOSIS — Z88.1: ICD-10-CM
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
## 2022-10-15 DIAGNOSIS — Z91.81: ICD-10-CM

## 2022-10-15 DIAGNOSIS — Z20.822: ICD-10-CM

## 2022-10-15 DIAGNOSIS — S42.001A: Primary | ICD-10-CM

## 2022-10-15 DIAGNOSIS — Z88.0: ICD-10-CM

## 2022-10-15 DIAGNOSIS — Z90.710: ICD-10-CM

## 2022-10-15 DIAGNOSIS — Z95.5: ICD-10-CM

## 2022-10-15 DIAGNOSIS — M21.921: ICD-10-CM

## 2022-10-15 DIAGNOSIS — Z88.5: ICD-10-CM

## 2022-10-15 DIAGNOSIS — I12.9: ICD-10-CM

## 2022-10-15 LAB
ALBUMIN SERPL BCG-MCNC: 3.6 G/DL (ref 3.4–4.8)
ALP SERPL-CCNC: 97 U/L (ref 40–110)
ALT SERPL W P-5'-P-CCNC: 21 U/L (ref 8–55)
ANION GAP SERPL CALC-SCNC: 16 MMOL/L (ref 10–20)
AST SERPL-CCNC: 21 U/L (ref 5–34)
BASOPHILS # BLD AUTO: 0.1 THOU/UL (ref 0–0.2)
BASOPHILS NFR BLD AUTO: 0.9 % (ref 0–1)
BILIRUB SERPL-MCNC: 0.6 MG/DL (ref 0.2–1.2)
BUN SERPL-MCNC: 27 MG/DL (ref 9.8–20.1)
CALCIUM SERPL-MCNC: 8.3 MG/DL (ref 7.8–10.44)
CHLORIDE SERPL-SCNC: 106 MMOL/L (ref 98–107)
CK MB SERPL-MCNC: 3.1 NG/ML (ref 0–6.6)
CO2 SERPL-SCNC: 24 MMOL/L (ref 23–31)
CREAT CL PREDICTED SERPL C-G-VRATE: 0 ML/MIN (ref 70–130)
EOSINOPHIL # BLD AUTO: 0.3 THOU/UL (ref 0–0.7)
EOSINOPHIL NFR BLD AUTO: 3 % (ref 0–10)
GLOBULIN SER CALC-MCNC: 2.3 G/DL (ref 2.4–3.5)
GLUCOSE SERPL-MCNC: 109 MG/DL (ref 80–115)
HGB BLD-MCNC: 16.1 G/DL (ref 12–16)
LYMPHOCYTES # BLD: 0.9 THOU/UL (ref 1.2–3.4)
LYMPHOCYTES NFR BLD AUTO: 10.2 % (ref 21–51)
MCH RBC QN AUTO: 29 PG (ref 27–31)
MCV RBC AUTO: 94.4 FL (ref 78–98)
MONOCYTES # BLD AUTO: 0.7 THOU/UL (ref 0.11–0.59)
MONOCYTES NFR BLD AUTO: 7.4 % (ref 0–10)
NEUTROPHILS # BLD AUTO: 6.9 THOU/UL (ref 1.4–6.5)
NEUTROPHILS NFR BLD AUTO: 78.5 % (ref 42–75)
PLATELET # BLD AUTO: 172 THOU/UL (ref 130–400)
POTASSIUM SERPL-SCNC: 4.5 MMOL/L (ref 3.5–5.1)
RBC # BLD AUTO: 5.57 MILL/UL (ref 4.2–5.4)
SODIUM SERPL-SCNC: 141 MMOL/L (ref 136–145)
TROPONIN I SERPL DL<=0.01 NG/ML-MCNC: 0.03 NG/ML (ref ?–0.03)
TROPONIN I SERPL DL<=0.01 NG/ML-MCNC: 0.03 NG/ML (ref ?–0.03)
WBC # BLD AUTO: 8.8 THOU/UL (ref 4.8–10.8)

## 2022-10-15 PROCEDURE — 72125 CT NECK SPINE W/O DYE: CPT

## 2022-10-15 PROCEDURE — 95819 EEG AWAKE AND ASLEEP: CPT

## 2022-10-15 PROCEDURE — 80053 COMPREHEN METABOLIC PANEL: CPT

## 2022-10-15 PROCEDURE — 72131 CT LUMBAR SPINE W/O DYE: CPT

## 2022-10-15 PROCEDURE — 96374 THER/PROPH/DIAG INJ IV PUSH: CPT

## 2022-10-15 PROCEDURE — 94760 N-INVAS EAR/PLS OXIMETRY 1: CPT

## 2022-10-15 PROCEDURE — 93306 TTE W/DOPPLER COMPLETE: CPT

## 2022-10-15 PROCEDURE — 36415 COLL VENOUS BLD VENIPUNCTURE: CPT

## 2022-10-15 PROCEDURE — 95957 EEG DIGITAL ANALYSIS: CPT

## 2022-10-15 PROCEDURE — 96376 TX/PRO/DX INJ SAME DRUG ADON: CPT

## 2022-10-15 PROCEDURE — 72128 CT CHEST SPINE W/O DYE: CPT

## 2022-10-15 PROCEDURE — 82553 CREATINE MB FRACTION: CPT

## 2022-10-15 PROCEDURE — 85025 COMPLETE CBC W/AUTO DIFF WBC: CPT

## 2022-10-15 PROCEDURE — 70450 CT HEAD/BRAIN W/O DYE: CPT

## 2022-10-15 PROCEDURE — 84484 ASSAY OF TROPONIN QUANT: CPT

## 2022-10-15 PROCEDURE — U0003 INFECTIOUS AGENT DETECTION BY NUCLEIC ACID (DNA OR RNA); SEVERE ACUTE RESPIRATORY SYNDROME CORONAVIRUS 2 (SARS-COV-2) (CORONAVIRUS DISEASE [COVID-19]), AMPLIFIED PROBE TECHNIQUE, MAKING USE OF HIGH THROUGHPUT TECHNOLOGIES AS DESCRIBED BY CMS-2020-01-R: HCPCS

## 2022-10-15 PROCEDURE — 96375 TX/PRO/DX INJ NEW DRUG ADDON: CPT

## 2022-10-15 PROCEDURE — U0005 INFEC AGEN DETEC AMPLI PROBE: HCPCS

## 2022-10-15 PROCEDURE — 80048 BASIC METABOLIC PNL TOTAL CA: CPT

## 2022-10-15 PROCEDURE — 80306 DRUG TEST PRSMV INSTRMNT: CPT

## 2022-10-15 PROCEDURE — 93010 ELECTROCARDIOGRAM REPORT: CPT

## 2022-10-15 PROCEDURE — 93005 ELECTROCARDIOGRAM TRACING: CPT

## 2022-10-15 PROCEDURE — 95816 EEG AWAKE AND DROWSY: CPT

## 2022-10-15 PROCEDURE — 84443 ASSAY THYROID STIM HORMONE: CPT

## 2022-10-16 LAB
ANION GAP SERPL CALC-SCNC: 16 MMOL/L (ref 10–20)
BASOPHILS # BLD AUTO: 0.1 THOU/UL (ref 0–0.2)
BASOPHILS NFR BLD AUTO: 0.8 % (ref 0–1)
BUN SERPL-MCNC: 26 MG/DL (ref 9.8–20.1)
CALCIUM SERPL-MCNC: 8.2 MG/DL (ref 7.8–10.44)
CHLORIDE SERPL-SCNC: 106 MMOL/L (ref 98–107)
CO2 SERPL-SCNC: 20 MMOL/L (ref 23–31)
CREAT CL PREDICTED SERPL C-G-VRATE: 35 ML/MIN (ref 70–130)
DRUG SCREEN CUTOFF: (no result)
EOSINOPHIL # BLD AUTO: 0.2 THOU/UL (ref 0–0.7)
EOSINOPHIL NFR BLD AUTO: 3.2 % (ref 0–10)
GLUCOSE SERPL-MCNC: 88 MG/DL (ref 80–115)
HGB BLD-MCNC: 14.4 G/DL (ref 12–16)
LYMPHOCYTES # BLD: 1.2 THOU/UL (ref 1.2–3.4)
LYMPHOCYTES NFR BLD AUTO: 16.8 % (ref 21–51)
MCH RBC QN AUTO: 29.3 PG (ref 27–31)
MCV RBC AUTO: 97.4 FL (ref 78–98)
MONOCYTES # BLD AUTO: 1 THOU/UL (ref 0.11–0.59)
MONOCYTES NFR BLD AUTO: 13.7 % (ref 0–10)
NEUTROPHILS # BLD AUTO: 4.6 THOU/UL (ref 1.4–6.5)
NEUTROPHILS NFR BLD AUTO: 65.4 % (ref 42–75)
PLATELET # BLD AUTO: 165 THOU/UL (ref 130–400)
POTASSIUM SERPL-SCNC: 5.1 MMOL/L (ref 3.5–5.1)
RBC # BLD AUTO: 4.92 MILL/UL (ref 4.2–5.4)
SODIUM SERPL-SCNC: 137 MMOL/L (ref 136–145)
WBC # BLD AUTO: 7 THOU/UL (ref 4.8–10.8)

## 2022-10-16 RX ADMIN — HYDROCODONE BITARTRATE AND ACETAMINOPHEN PRN TAB: 5; 325 TABLET ORAL at 08:01

## 2022-10-16 RX ADMIN — ASPIRIN SCH MG: 81 TABLET ORAL at 08:01

## 2022-10-16 RX ADMIN — HYDROCODONE BITARTRATE AND ACETAMINOPHEN PRN TAB: 5; 325 TABLET ORAL at 02:03

## 2022-10-16 RX ADMIN — HYDROCODONE BITARTRATE AND ACETAMINOPHEN PRN TAB: 5; 325 TABLET ORAL at 20:30

## 2022-10-17 VITALS — SYSTOLIC BLOOD PRESSURE: 198 MMHG | TEMPERATURE: 97.6 F | DIASTOLIC BLOOD PRESSURE: 95 MMHG

## 2022-10-17 LAB
ANION GAP SERPL CALC-SCNC: 11 MMOL/L (ref 10–20)
BUN SERPL-MCNC: 16 MG/DL (ref 9.8–20.1)
CALCIUM SERPL-MCNC: 8.6 MG/DL (ref 7.8–10.44)
CHLORIDE SERPL-SCNC: 102 MMOL/L (ref 98–107)
CO2 SERPL-SCNC: 31 MMOL/L (ref 23–31)
CREAT CL PREDICTED SERPL C-G-VRATE: 43 ML/MIN (ref 70–130)
GLUCOSE SERPL-MCNC: 95 MG/DL (ref 80–115)
POTASSIUM SERPL-SCNC: 3.9 MMOL/L (ref 3.5–5.1)
SODIUM SERPL-SCNC: 140 MMOL/L (ref 136–145)

## 2022-10-17 RX ADMIN — HYDROCODONE BITARTRATE AND ACETAMINOPHEN PRN TAB: 5; 325 TABLET ORAL at 04:46

## 2022-10-17 RX ADMIN — HYDROCODONE BITARTRATE AND ACETAMINOPHEN PRN TAB: 5; 325 TABLET ORAL at 17:03

## 2022-10-17 RX ADMIN — ASPIRIN SCH MG: 81 TABLET ORAL at 08:54

## 2022-11-14 ENCOUNTER — HOSPITAL ENCOUNTER (INPATIENT)
Dept: HOSPITAL 92 - ERS | Age: 67
LOS: 18 days | Discharge: HOME HEALTH | DRG: 377 | End: 2022-12-02
Attending: HOSPITALIST | Admitting: HOSPITALIST
Payer: MEDICARE

## 2022-11-14 VITALS — BODY MASS INDEX: 17.5 KG/M2

## 2022-11-14 DIAGNOSIS — I73.9: ICD-10-CM

## 2022-11-14 DIAGNOSIS — Z90.49: ICD-10-CM

## 2022-11-14 DIAGNOSIS — D62: ICD-10-CM

## 2022-11-14 DIAGNOSIS — R57.8: ICD-10-CM

## 2022-11-14 DIAGNOSIS — I13.0: ICD-10-CM

## 2022-11-14 DIAGNOSIS — Z90.710: ICD-10-CM

## 2022-11-14 DIAGNOSIS — K31.811: Primary | ICD-10-CM

## 2022-11-14 DIAGNOSIS — I50.9: ICD-10-CM

## 2022-11-14 DIAGNOSIS — J18.9: ICD-10-CM

## 2022-11-14 DIAGNOSIS — E43: ICD-10-CM

## 2022-11-14 DIAGNOSIS — Z88.1: ICD-10-CM

## 2022-11-14 DIAGNOSIS — E78.00: ICD-10-CM

## 2022-11-14 DIAGNOSIS — I69.320: ICD-10-CM

## 2022-11-14 DIAGNOSIS — Z88.0: ICD-10-CM

## 2022-11-14 DIAGNOSIS — Z66: ICD-10-CM

## 2022-11-14 DIAGNOSIS — E87.20: ICD-10-CM

## 2022-11-14 DIAGNOSIS — I69.351: ICD-10-CM

## 2022-11-14 DIAGNOSIS — K31.89: ICD-10-CM

## 2022-11-14 DIAGNOSIS — Z79.02: ICD-10-CM

## 2022-11-14 DIAGNOSIS — K44.9: ICD-10-CM

## 2022-11-14 DIAGNOSIS — Z82.3: ICD-10-CM

## 2022-11-14 DIAGNOSIS — Z88.5: ICD-10-CM

## 2022-11-14 DIAGNOSIS — Z71.6: ICD-10-CM
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
## 2022-11-14 DIAGNOSIS — N18.30: ICD-10-CM

## 2022-11-14 DIAGNOSIS — Z79.899: ICD-10-CM

## 2022-11-14 DIAGNOSIS — F17.210: ICD-10-CM

## 2022-11-14 DIAGNOSIS — I25.10: ICD-10-CM

## 2022-11-14 DIAGNOSIS — I21.4: ICD-10-CM

## 2022-11-14 DIAGNOSIS — Z82.49: ICD-10-CM

## 2022-11-14 DIAGNOSIS — K29.70: ICD-10-CM

## 2022-11-14 DIAGNOSIS — I48.0: ICD-10-CM

## 2022-11-14 DIAGNOSIS — Z20.822: ICD-10-CM

## 2022-11-14 DIAGNOSIS — F15.10: ICD-10-CM

## 2022-11-14 LAB
ALBUMIN SERPL BCG-MCNC: 4.1 G/DL (ref 3.4–4.8)
ALP SERPL-CCNC: 96 U/L (ref 40–110)
ALT SERPL W P-5'-P-CCNC: 23 U/L (ref 8–55)
ANION GAP SERPL CALC-SCNC: 14 MMOL/L (ref 10–20)
AST SERPL-CCNC: 25 U/L (ref 5–34)
BASOPHILS # BLD AUTO: 0 THOU/UL (ref 0–0.2)
BASOPHILS NFR BLD AUTO: 0.1 % (ref 0–1)
BILIRUB SERPL-MCNC: 0.6 MG/DL (ref 0.2–1.2)
BUN SERPL-MCNC: 49 MG/DL (ref 9.8–20.1)
CALCIUM SERPL-MCNC: 9.2 MG/DL (ref 7.8–10.44)
CHLORIDE SERPL-SCNC: 101 MMOL/L (ref 98–107)
CK MB SERPL-MCNC: 3.9 NG/ML (ref 0–6.6)
CO2 SERPL-SCNC: 28 MMOL/L (ref 23–31)
CREAT CL PREDICTED SERPL C-G-VRATE: 0 ML/MIN (ref 70–130)
EOSINOPHIL # BLD AUTO: 0.2 THOU/UL (ref 0–0.7)
EOSINOPHIL NFR BLD AUTO: 2.6 % (ref 0–10)
GLOBULIN SER CALC-MCNC: 2.8 G/DL (ref 2.4–3.5)
GLUCOSE SERPL-MCNC: 87 MG/DL (ref 80–115)
HGB BLD-MCNC: 13.4 G/DL (ref 12–16)
LYMPHOCYTES # BLD: 1 THOU/UL (ref 1.2–3.4)
LYMPHOCYTES NFR BLD AUTO: 12.2 % (ref 21–51)
MCH RBC QN AUTO: 30.3 PG (ref 27–31)
MCV RBC AUTO: 94.7 FL (ref 78–98)
MONOCYTES # BLD AUTO: 0.7 THOU/UL (ref 0.11–0.59)
MONOCYTES NFR BLD AUTO: 7.8 % (ref 0–10)
NEUTROPHILS # BLD AUTO: 6.4 THOU/UL (ref 1.4–6.5)
NEUTROPHILS NFR BLD AUTO: 77.3 % (ref 42–75)
PLATELET # BLD AUTO: 130 10X3/UL (ref 130–400)
POTASSIUM SERPL-SCNC: 4.8 MMOL/L (ref 3.5–5.1)
RBC # BLD AUTO: 4.44 MILL/UL (ref 4.2–5.4)
SODIUM SERPL-SCNC: 138 MMOL/L (ref 136–145)
SP GR UR STRIP: 1.01 (ref 1–1.04)
TROPONIN I SERPL DL<=0.01 NG/ML-MCNC: 0.04 NG/ML (ref ?–0.03)
TROPONIN I SERPL DL<=0.01 NG/ML-MCNC: 0.05 NG/ML (ref ?–0.03)
WBC # BLD AUTO: 8.3 10X3/UL (ref 4.8–10.8)

## 2022-11-14 PROCEDURE — 83735 ASSAY OF MAGNESIUM: CPT

## 2022-11-14 PROCEDURE — 88305 TISSUE EXAM BY PATHOLOGIST: CPT

## 2022-11-14 PROCEDURE — 80053 COMPREHEN METABOLIC PANEL: CPT

## 2022-11-14 PROCEDURE — 93010 ELECTROCARDIOGRAM REPORT: CPT

## 2022-11-14 PROCEDURE — 96376 TX/PRO/DX INJ SAME DRUG ADON: CPT

## 2022-11-14 PROCEDURE — 87070 CULTURE OTHR SPECIMN AEROBIC: CPT

## 2022-11-14 PROCEDURE — 86850 RBC ANTIBODY SCREEN: CPT

## 2022-11-14 PROCEDURE — 87811 SARS-COV-2 COVID19 W/OPTIC: CPT

## 2022-11-14 PROCEDURE — 82805 BLOOD GASES W/O2 SATURATION: CPT

## 2022-11-14 PROCEDURE — 93005 ELECTROCARDIOGRAM TRACING: CPT

## 2022-11-14 PROCEDURE — U0003 INFECTIOUS AGENT DETECTION BY NUCLEIC ACID (DNA OR RNA); SEVERE ACUTE RESPIRATORY SYNDROME CORONAVIRUS 2 (SARS-COV-2) (CORONAVIRUS DISEASE [COVID-19]), AMPLIFIED PROBE TECHNIQUE, MAKING USE OF HIGH THROUGHPUT TECHNOLOGIES AS DESCRIBED BY CMS-2020-01-R: HCPCS

## 2022-11-14 PROCEDURE — 36600 WITHDRAWAL OF ARTERIAL BLOOD: CPT

## 2022-11-14 PROCEDURE — 84145 PROCALCITONIN (PCT): CPT

## 2022-11-14 PROCEDURE — 81003 URINALYSIS AUTO W/O SCOPE: CPT

## 2022-11-14 PROCEDURE — 83880 ASSAY OF NATRIURETIC PEPTIDE: CPT

## 2022-11-14 PROCEDURE — 96374 THER/PROPH/DIAG INJ IV PUSH: CPT

## 2022-11-14 PROCEDURE — 83605 ASSAY OF LACTIC ACID: CPT

## 2022-11-14 PROCEDURE — 80202 ASSAY OF VANCOMYCIN: CPT

## 2022-11-14 PROCEDURE — 94640 AIRWAY INHALATION TREATMENT: CPT

## 2022-11-14 PROCEDURE — 87205 SMEAR GRAM STAIN: CPT

## 2022-11-14 PROCEDURE — 86900 BLOOD TYPING SEROLOGIC ABO: CPT

## 2022-11-14 PROCEDURE — G0378 HOSPITAL OBSERVATION PER HR: HCPCS

## 2022-11-14 PROCEDURE — 93798 PHYS/QHP OP CAR RHAB W/ECG: CPT

## 2022-11-14 PROCEDURE — 71275 CT ANGIOGRAPHY CHEST: CPT

## 2022-11-14 PROCEDURE — 96375 TX/PRO/DX INJ NEW DRUG ADDON: CPT

## 2022-11-14 PROCEDURE — 87081 CULTURE SCREEN ONLY: CPT

## 2022-11-14 PROCEDURE — 84484 ASSAY OF TROPONIN QUANT: CPT

## 2022-11-14 PROCEDURE — 85025 COMPLETE CBC W/AUTO DIFF WBC: CPT

## 2022-11-14 PROCEDURE — 86901 BLOOD TYPING SEROLOGIC RH(D): CPT

## 2022-11-14 PROCEDURE — 93923 UPR/LXTR ART STDY 3+ LVLS: CPT

## 2022-11-14 PROCEDURE — 80048 BASIC METABOLIC PNL TOTAL CA: CPT

## 2022-11-14 PROCEDURE — C9113 INJ PANTOPRAZOLE SODIUM, VIA: HCPCS

## 2022-11-14 PROCEDURE — 82553 CREATINE MB FRACTION: CPT

## 2022-11-14 PROCEDURE — 81001 URINALYSIS AUTO W/SCOPE: CPT

## 2022-11-14 PROCEDURE — U0005 INFEC AGEN DETEC AMPLI PROBE: HCPCS

## 2022-11-14 PROCEDURE — 71045 X-RAY EXAM CHEST 1 VIEW: CPT

## 2022-11-14 PROCEDURE — 87040 BLOOD CULTURE FOR BACTERIA: CPT

## 2022-11-14 PROCEDURE — 82533 TOTAL CORTISOL: CPT

## 2022-11-14 PROCEDURE — 93306 TTE W/DOPPLER COMPLETE: CPT

## 2022-11-14 PROCEDURE — 84100 ASSAY OF PHOSPHORUS: CPT

## 2022-11-14 PROCEDURE — 36415 COLL VENOUS BLD VENIPUNCTURE: CPT

## 2022-11-14 RX ADMIN — ALBUTEROL SULFATE SCH MG: 2.5 SOLUTION RESPIRATORY (INHALATION) at 23:45

## 2022-11-15 LAB
ANION GAP SERPL CALC-SCNC: 14 MMOL/L (ref 10–20)
BASOPHILS # BLD AUTO: 0 THOU/UL (ref 0–0.2)
BASOPHILS NFR BLD AUTO: 0.2 % (ref 0–1)
BUN SERPL-MCNC: 32 MG/DL (ref 9.8–20.1)
CALCIUM SERPL-MCNC: 9 MG/DL (ref 7.8–10.44)
CHLORIDE SERPL-SCNC: 104 MMOL/L (ref 98–107)
CO2 SERPL-SCNC: 28 MMOL/L (ref 23–31)
CREAT CL PREDICTED SERPL C-G-VRATE: 36 ML/MIN (ref 70–130)
EOSINOPHIL # BLD AUTO: 0.2 THOU/UL (ref 0–0.7)
EOSINOPHIL NFR BLD AUTO: 3.8 % (ref 0–10)
GLUCOSE SERPL-MCNC: 85 MG/DL (ref 80–115)
HGB BLD-MCNC: 11.5 G/DL (ref 12–16)
LYMPHOCYTES # BLD: 0.9 THOU/UL (ref 1.2–3.4)
LYMPHOCYTES NFR BLD AUTO: 15.3 % (ref 21–51)
MCH RBC QN AUTO: 30.6 PG (ref 27–31)
MCV RBC AUTO: 96.7 FL (ref 78–98)
MONOCYTES # BLD AUTO: 0.6 THOU/UL (ref 0.11–0.59)
MONOCYTES NFR BLD AUTO: 10.8 % (ref 0–10)
NEUTROPHILS # BLD AUTO: 4 THOU/UL (ref 1.4–6.5)
NEUTROPHILS NFR BLD AUTO: 69.9 % (ref 42–75)
PLATELET # BLD AUTO: 111 10X3/UL (ref 130–400)
POTASSIUM SERPL-SCNC: 4.2 MMOL/L (ref 3.5–5.1)
RBC # BLD AUTO: 3.76 MILL/UL (ref 4.2–5.4)
SODIUM SERPL-SCNC: 142 MMOL/L (ref 136–145)
WBC # BLD AUTO: 5.8 10X3/UL (ref 4.8–10.8)

## 2022-11-15 RX ADMIN — ALBUTEROL SULFATE SCH MG: 2.5 SOLUTION RESPIRATORY (INHALATION) at 18:34

## 2022-11-15 RX ADMIN — ALBUTEROL SULFATE SCH MG: 2.5 SOLUTION RESPIRATORY (INHALATION) at 07:35

## 2022-11-15 RX ADMIN — POLYETHYLENE GLYCOL 3350, SODIUM SULFATE ANHYDROUS, SODIUM BICARBONATE, SODIUM CHLORIDE, POTASSIUM CHLORIDE SCH ML: 236; 22.74; 6.74; 5.86; 2.97 POWDER, FOR SOLUTION ORAL at 20:43

## 2022-11-15 RX ADMIN — ALBUTEROL SULFATE SCH MG: 2.5 SOLUTION RESPIRATORY (INHALATION) at 14:29

## 2022-11-16 LAB
ALBUMIN SERPL BCG-MCNC: 3.4 G/DL (ref 3.4–4.8)
ALP SERPL-CCNC: 78 U/L (ref 40–110)
ALT SERPL W P-5'-P-CCNC: 16 U/L (ref 8–55)
ANION GAP SERPL CALC-SCNC: 12 MMOL/L (ref 10–20)
AST SERPL-CCNC: 16 U/L (ref 5–34)
BASOPHILS # BLD AUTO: 0 THOU/UL (ref 0–0.2)
BASOPHILS NFR BLD AUTO: 0.4 % (ref 0–1)
BILIRUB SERPL-MCNC: 1.4 MG/DL (ref 0.2–1.2)
BUN SERPL-MCNC: 26 MG/DL (ref 9.8–20.1)
CALCIUM SERPL-MCNC: 9 MG/DL (ref 7.8–10.44)
CHLORIDE SERPL-SCNC: 102 MMOL/L (ref 98–107)
CO2 SERPL-SCNC: 26 MMOL/L (ref 23–31)
CREAT CL PREDICTED SERPL C-G-VRATE: 38 ML/MIN (ref 70–130)
EOSINOPHIL # BLD AUTO: 0.2 THOU/UL (ref 0–0.7)
EOSINOPHIL NFR BLD AUTO: 3.5 % (ref 0–10)
GLOBULIN SER CALC-MCNC: 2.7 G/DL (ref 2.4–3.5)
GLUCOSE SERPL-MCNC: 91 MG/DL (ref 80–115)
HGB BLD-MCNC: 10.5 G/DL (ref 12–16)
HGB BLD-MCNC: 10.6 G/DL (ref 12–16)
LYMPHOCYTES # BLD: 0.8 THOU/UL (ref 1.2–3.4)
LYMPHOCYTES NFR BLD AUTO: 14.1 % (ref 21–51)
MCH RBC QN AUTO: 30.8 PG (ref 27–31)
MCV RBC AUTO: 95.9 FL (ref 78–98)
MONOCYTES # BLD AUTO: 0.6 THOU/UL (ref 0.11–0.59)
MONOCYTES NFR BLD AUTO: 11.5 % (ref 0–10)
NEUTROPHILS # BLD AUTO: 3.7 THOU/UL (ref 1.4–6.5)
NEUTROPHILS NFR BLD AUTO: 70.4 % (ref 42–75)
PLATELET # BLD AUTO: 109 10X3/UL (ref 130–400)
POTASSIUM SERPL-SCNC: 3.9 MMOL/L (ref 3.5–5.1)
RBC # BLD AUTO: 3.43 MILL/UL (ref 4.2–5.4)
SODIUM SERPL-SCNC: 136 MMOL/L (ref 136–145)
WBC # BLD AUTO: 5.3 10X3/UL (ref 4.8–10.8)

## 2022-11-16 PROCEDURE — 0DB78ZX EXCISION OF STOMACH, PYLORUS, VIA NATURAL OR ARTIFICIAL OPENING ENDOSCOPIC, DIAGNOSTIC: ICD-10-PCS | Performed by: INTERNAL MEDICINE

## 2022-11-16 PROCEDURE — 0W3P8ZZ CONTROL BLEEDING IN GASTROINTESTINAL TRACT, VIA NATURAL OR ARTIFICIAL OPENING ENDOSCOPIC: ICD-10-PCS | Performed by: INTERNAL MEDICINE

## 2022-11-16 PROCEDURE — 0DJD8ZZ INSPECTION OF LOWER INTESTINAL TRACT, VIA NATURAL OR ARTIFICIAL OPENING ENDOSCOPIC: ICD-10-PCS | Performed by: INTERNAL MEDICINE

## 2022-11-16 RX ADMIN — ALBUTEROL SULFATE SCH: 2.5 SOLUTION RESPIRATORY (INHALATION) at 02:18

## 2022-11-16 RX ADMIN — ALBUTEROL SULFATE SCH: 2.5 SOLUTION RESPIRATORY (INHALATION) at 18:16

## 2022-11-16 RX ADMIN — ALBUTEROL SULFATE SCH MG: 2.5 SOLUTION RESPIRATORY (INHALATION) at 07:20

## 2022-11-16 RX ADMIN — POLYETHYLENE GLYCOL 3350, SODIUM SULFATE ANHYDROUS, SODIUM BICARBONATE, SODIUM CHLORIDE, POTASSIUM CHLORIDE SCH ML: 236; 22.74; 6.74; 5.86; 2.97 POWDER, FOR SOLUTION ORAL at 03:27

## 2022-11-16 RX ADMIN — ALBUTEROL SULFATE SCH MG: 2.5 SOLUTION RESPIRATORY (INHALATION) at 14:06

## 2022-11-17 LAB
ANION GAP SERPL CALC-SCNC: 9 MMOL/L (ref 10–20)
BASOPHILS # BLD AUTO: 0 THOU/UL (ref 0–0.2)
BASOPHILS NFR BLD AUTO: 0.5 % (ref 0–1)
BUN SERPL-MCNC: 25 MG/DL (ref 9.8–20.1)
CALCIUM SERPL-MCNC: 8.7 MG/DL (ref 7.8–10.44)
CHLORIDE SERPL-SCNC: 106 MMOL/L (ref 98–107)
CO2 SERPL-SCNC: 30 MMOL/L (ref 23–31)
CREAT CL PREDICTED SERPL C-G-VRATE: 33 ML/MIN (ref 70–130)
EOSINOPHIL # BLD AUTO: 0.3 THOU/UL (ref 0–0.7)
EOSINOPHIL NFR BLD AUTO: 4.9 % (ref 0–10)
GLUCOSE SERPL-MCNC: 99 MG/DL (ref 80–115)
HGB BLD-MCNC: 10 G/DL (ref 12–16)
LYMPHOCYTES # BLD: 0.9 THOU/UL (ref 1.2–3.4)
LYMPHOCYTES NFR BLD AUTO: 17.8 % (ref 21–51)
MAGNESIUM SERPL-MCNC: 2 MG/DL (ref 1.6–2.6)
MCH RBC QN AUTO: 31.3 PG (ref 27–31)
MCV RBC AUTO: 97 FL (ref 78–98)
MONOCYTES # BLD AUTO: 0.5 THOU/UL (ref 0.11–0.59)
MONOCYTES NFR BLD AUTO: 9.5 % (ref 0–10)
NEUTROPHILS # BLD AUTO: 3.5 THOU/UL (ref 1.4–6.5)
NEUTROPHILS NFR BLD AUTO: 67.3 % (ref 42–75)
PLATELET # BLD AUTO: 106 10X3/UL (ref 130–400)
POTASSIUM SERPL-SCNC: 4.4 MMOL/L (ref 3.5–5.1)
RBC # BLD AUTO: 3.19 MILL/UL (ref 4.2–5.4)
SODIUM SERPL-SCNC: 141 MMOL/L (ref 136–145)
WBC # BLD AUTO: 5.2 10X3/UL (ref 4.8–10.8)

## 2022-11-17 RX ADMIN — ALBUTEROL SULFATE SCH MG: 2.5 SOLUTION RESPIRATORY (INHALATION) at 18:51

## 2022-11-17 RX ADMIN — ALBUTEROL SULFATE SCH MG: 2.5 SOLUTION RESPIRATORY (INHALATION) at 13:59

## 2022-11-17 RX ADMIN — ALBUTEROL SULFATE SCH MG: 2.5 SOLUTION RESPIRATORY (INHALATION) at 07:52

## 2022-11-17 RX ADMIN — ALBUTEROL SULFATE SCH: 2.5 SOLUTION RESPIRATORY (INHALATION) at 23:42

## 2022-11-17 RX ADMIN — ALBUTEROL SULFATE SCH MG: 2.5 SOLUTION RESPIRATORY (INHALATION) at 01:17

## 2022-11-18 LAB
ALBUMIN SERPL BCG-MCNC: 3.3 G/DL (ref 3.4–4.8)
ALP SERPL-CCNC: 64 U/L (ref 40–110)
ALT SERPL W P-5'-P-CCNC: 12 U/L (ref 8–55)
ANALYZER IN CARDIO: (no result)
ANION GAP SERPL CALC-SCNC: 14 MMOL/L (ref 10–20)
ANION GAP SERPL CALC-SCNC: 14 MMOL/L (ref 10–20)
ANION GAP SERPL CALC-SCNC: 18 MMOL/L (ref 10–20)
AST SERPL-CCNC: 21 U/L (ref 5–34)
BASE EXCESS STD BLDA CALC-SCNC: 3 MEQ/L
BASOPHILS # BLD AUTO: 0 THOU/UL (ref 0–0.2)
BASOPHILS NFR BLD AUTO: 0 % (ref 0–1)
BASOPHILS NFR BLD AUTO: 0.3 % (ref 0–1)
BASOPHILS NFR BLD AUTO: 0.6 % (ref 0–1)
BILIRUB SERPL-MCNC: 1 MG/DL (ref 0.2–1.2)
BUN SERPL-MCNC: 15 MG/DL (ref 9.8–20.1)
BUN SERPL-MCNC: 16 MG/DL (ref 9.8–20.1)
BUN SERPL-MCNC: 18 MG/DL (ref 9.8–20.1)
CA-I BLDA-SCNC: 1.03 MMOL/L (ref 1.12–1.3)
CALCIUM SERPL-MCNC: 8.2 MG/DL (ref 7.8–10.44)
CALCIUM SERPL-MCNC: 8.4 MG/DL (ref 7.8–10.44)
CALCIUM SERPL-MCNC: 8.5 MG/DL (ref 7.8–10.44)
CAUTI INDICATIONS FOR CULTURE: (no result)
CHLORIDE SERPL-SCNC: 103 MMOL/L (ref 98–107)
CHLORIDE SERPL-SCNC: 106 MMOL/L (ref 98–107)
CHLORIDE SERPL-SCNC: 98 MMOL/L (ref 98–107)
CO2 SERPL-SCNC: 23 MMOL/L (ref 23–31)
CO2 SERPL-SCNC: 23 MMOL/L (ref 23–31)
CO2 SERPL-SCNC: 24 MMOL/L (ref 23–31)
CREAT CL PREDICTED SERPL C-G-VRATE: 26 ML/MIN (ref 70–130)
CREAT CL PREDICTED SERPL C-G-VRATE: 32 ML/MIN (ref 70–130)
CREAT CL PREDICTED SERPL C-G-VRATE: 34 ML/MIN (ref 70–130)
EOSINOPHIL # BLD AUTO: 0 THOU/UL (ref 0–0.7)
EOSINOPHIL # BLD AUTO: 0.1 THOU/UL (ref 0–0.7)
EOSINOPHIL # BLD AUTO: 0.1 THOU/UL (ref 0–0.7)
EOSINOPHIL NFR BLD AUTO: 0.1 % (ref 0–10)
EOSINOPHIL NFR BLD AUTO: 2.2 % (ref 0–10)
EOSINOPHIL NFR BLD AUTO: 2.2 % (ref 0–10)
GLOBULIN SER CALC-MCNC: 2.8 G/DL (ref 2.4–3.5)
GLUCOSE SERPL-MCNC: 120 MG/DL (ref 80–115)
GLUCOSE SERPL-MCNC: 137 MG/DL (ref 80–115)
GLUCOSE SERPL-MCNC: 94 MG/DL (ref 80–115)
HCO3 BLDA-SCNC: 25.6 MEQ/L (ref 22–28)
HCT VFR BLDA CALC: 31 % (ref 36–47)
HGB BLD-MCNC: 10.1 G/DL (ref 12–16)
HGB BLD-MCNC: 10.1 G/DL (ref 12–16)
HGB BLD-MCNC: 10.4 G/DL (ref 12–16)
HGB BLDA-MCNC: 10.5 G/DL (ref 12–16)
LYMPHOCYTES # BLD: 0.1 THOU/UL (ref 1.2–3.4)
LYMPHOCYTES # BLD: 0.2 THOU/UL (ref 1.2–3.4)
LYMPHOCYTES # BLD: 0.3 THOU/UL (ref 1.2–3.4)
LYMPHOCYTES NFR BLD AUTO: 2.6 % (ref 21–51)
LYMPHOCYTES NFR BLD AUTO: 5.3 % (ref 21–51)
LYMPHOCYTES NFR BLD AUTO: 5.6 % (ref 21–51)
MAGNESIUM SERPL-MCNC: 1.7 MG/DL (ref 1.6–2.6)
MCH RBC QN AUTO: 30.4 PG (ref 27–31)
MCH RBC QN AUTO: 31.6 PG (ref 27–31)
MCH RBC QN AUTO: 31.7 PG (ref 27–31)
MCV RBC AUTO: 95.4 FL (ref 78–98)
MCV RBC AUTO: 96.7 FL (ref 78–98)
MCV RBC AUTO: 97.3 FL (ref 78–98)
MONOCYTES # BLD AUTO: 0.3 THOU/UL (ref 0.11–0.59)
MONOCYTES # BLD AUTO: 0.3 THOU/UL (ref 0.11–0.59)
MONOCYTES # BLD AUTO: 0.4 THOU/UL (ref 0.11–0.59)
MONOCYTES NFR BLD AUTO: 6.5 % (ref 0–10)
MONOCYTES NFR BLD AUTO: 7.7 % (ref 0–10)
MONOCYTES NFR BLD AUTO: 7.9 % (ref 0–10)
NEUTROPHILS # BLD AUTO: 3.7 THOU/UL (ref 1.4–6.5)
NEUTROPHILS # BLD AUTO: 4.5 THOU/UL (ref 1.4–6.5)
NEUTROPHILS # BLD AUTO: 4.6 THOU/UL (ref 1.4–6.5)
NEUTROPHILS NFR BLD AUTO: 84.5 % (ref 42–75)
NEUTROPHILS NFR BLD AUTO: 85.6 % (ref 42–75)
NEUTROPHILS NFR BLD AUTO: 88.9 % (ref 42–75)
O2 A-A PPRESDIFF RESPIRATORY: 162.06 MMHG (ref 0–20)
PCO2 BLDA: 32.1 MMHG (ref 35–45)
PH BLDA: 7.52 [PH] (ref 7.35–7.45)
PLATELET # BLD AUTO: 109 10X3/UL (ref 130–400)
PLATELET # BLD AUTO: 111 10X3/UL (ref 130–400)
PLATELET # BLD AUTO: 117 10X3/UL (ref 130–400)
PO2 BLDA: 54.5 MMHG (ref 80–?)
POTASSIUM BLD-SCNC: 3.34 MMOL/L (ref 3.7–5.3)
POTASSIUM SERPL-SCNC: 3.9 MMOL/L (ref 3.5–5.1)
POTASSIUM SERPL-SCNC: 3.9 MMOL/L (ref 3.5–5.1)
POTASSIUM SERPL-SCNC: 4 MMOL/L (ref 3.5–5.1)
RBC # BLD AUTO: 3.18 MILL/UL (ref 4.2–5.4)
RBC # BLD AUTO: 3.28 MILL/UL (ref 4.2–5.4)
RBC # BLD AUTO: 3.32 MILL/UL (ref 4.2–5.4)
SODIUM SERPL-SCNC: 135 MMOL/L (ref 136–145)
SODIUM SERPL-SCNC: 137 MMOL/L (ref 136–145)
SODIUM SERPL-SCNC: 139 MMOL/L (ref 136–145)
SP GR UR STRIP: 1.01 (ref 1–1.04)
SPECIMEN DRAWN FROM PATIENT: (no result)
WBC # BLD AUTO: 4.3 10X3/UL (ref 4.8–10.8)
WBC # BLD AUTO: 5.1 10X3/UL (ref 4.8–10.8)
WBC # BLD AUTO: 5.2 10X3/UL (ref 4.8–10.8)
WBC UR QL AUTO: (no result) HPF (ref 0–3)

## 2022-11-18 RX ADMIN — ALBUTEROL SULFATE SCH MG: 2.5 SOLUTION RESPIRATORY (INHALATION) at 13:33

## 2022-11-18 RX ADMIN — ALBUTEROL SULFATE SCH MG: 2.5 SOLUTION RESPIRATORY (INHALATION) at 23:52

## 2022-11-18 RX ADMIN — ALBUTEROL SULFATE SCH: 2.5 SOLUTION RESPIRATORY (INHALATION) at 19:27

## 2022-11-18 RX ADMIN — ALBUTEROL SULFATE SCH MG: 2.5 SOLUTION RESPIRATORY (INHALATION) at 07:55

## 2022-11-18 RX ADMIN — NOREPINEPHRINE BITARTRATE SCH MLS: 8 INJECTION, SOLUTION INTRAVENOUS at 20:33

## 2022-11-19 LAB
ALBUMIN SERPL BCG-MCNC: 3 G/DL (ref 3.4–4.8)
ALP SERPL-CCNC: 63 U/L (ref 40–110)
ALT SERPL W P-5'-P-CCNC: 12 U/L (ref 8–55)
ANION GAP SERPL CALC-SCNC: 14 MMOL/L (ref 10–20)
AST SERPL-CCNC: 37 U/L (ref 5–34)
BASOPHILS # BLD AUTO: 0 THOU/UL (ref 0–0.2)
BASOPHILS NFR BLD AUTO: 0.1 % (ref 0–1)
BILIRUB SERPL-MCNC: 0.8 MG/DL (ref 0.2–1.2)
BUN SERPL-MCNC: 20 MG/DL (ref 9.8–20.1)
CALCIUM SERPL-MCNC: 7.8 MG/DL (ref 7.8–10.44)
CHLORIDE SERPL-SCNC: 99 MMOL/L (ref 98–107)
CO2 SERPL-SCNC: 25 MMOL/L (ref 23–31)
CREAT CL PREDICTED SERPL C-G-VRATE: 27 ML/MIN (ref 70–130)
EOSINOPHIL # BLD AUTO: 0 THOU/UL (ref 0–0.7)
EOSINOPHIL NFR BLD AUTO: 0.1 % (ref 0–10)
GLOBULIN SER CALC-MCNC: 2.6 G/DL (ref 2.4–3.5)
GLUCOSE SERPL-MCNC: 92 MG/DL (ref 80–115)
HGB BLD-MCNC: 9.2 G/DL (ref 12–16)
LYMPHOCYTES # BLD: 0.3 THOU/UL (ref 1.2–3.4)
LYMPHOCYTES NFR BLD AUTO: 5.9 % (ref 21–51)
MAGNESIUM SERPL-MCNC: 1.4 MG/DL (ref 1.6–2.6)
MCH RBC QN AUTO: 29.8 PG (ref 27–31)
MCV RBC AUTO: 95.4 FL (ref 78–98)
MONOCYTES # BLD AUTO: 0.5 THOU/UL (ref 0.11–0.59)
MONOCYTES NFR BLD AUTO: 9.3 % (ref 0–10)
NEUTROPHILS # BLD AUTO: 4.3 THOU/UL (ref 1.4–6.5)
NEUTROPHILS NFR BLD AUTO: 84.5 % (ref 42–75)
PLATELET # BLD AUTO: 114 10X3/UL (ref 130–400)
POTASSIUM SERPL-SCNC: 3.3 MMOL/L (ref 3.5–5.1)
POTASSIUM SERPL-SCNC: 4.2 MMOL/L (ref 3.5–5.1)
RBC # BLD AUTO: 3.09 MILL/UL (ref 4.2–5.4)
SODIUM SERPL-SCNC: 135 MMOL/L (ref 136–145)
WBC # BLD AUTO: 5.1 10X3/UL (ref 4.8–10.8)

## 2022-11-19 RX ADMIN — ALBUTEROL SULFATE SCH MG: 2.5 SOLUTION RESPIRATORY (INHALATION) at 13:13

## 2022-11-19 RX ADMIN — ALBUTEROL SULFATE SCH MG: 2.5 SOLUTION RESPIRATORY (INHALATION) at 18:26

## 2022-11-19 RX ADMIN — ALBUTEROL SULFATE SCH MG: 2.5 SOLUTION RESPIRATORY (INHALATION) at 07:20

## 2022-11-19 RX ADMIN — NOREPINEPHRINE BITARTRATE SCH MLS: 8 INJECTION, SOLUTION INTRAVENOUS at 14:54

## 2022-11-20 LAB
ANION GAP SERPL CALC-SCNC: 13 MMOL/L (ref 10–20)
BASOPHILS # BLD AUTO: 0 THOU/UL (ref 0–0.2)
BASOPHILS NFR BLD AUTO: 0 % (ref 0–1)
BUN SERPL-MCNC: 24 MG/DL (ref 9.8–20.1)
CALCIUM SERPL-MCNC: 7.9 MG/DL (ref 7.8–10.44)
CHLORIDE SERPL-SCNC: 101 MMOL/L (ref 98–107)
CO2 SERPL-SCNC: 23 MMOL/L (ref 23–31)
CREAT CL PREDICTED SERPL C-G-VRATE: 26 ML/MIN (ref 70–130)
EOSINOPHIL # BLD AUTO: 0 THOU/UL (ref 0–0.7)
EOSINOPHIL NFR BLD AUTO: 0 % (ref 0–10)
GLUCOSE SERPL-MCNC: 110 MG/DL (ref 80–115)
HGB BLD-MCNC: 9.1 G/DL (ref 12–16)
LYMPHOCYTES # BLD: 0.4 THOU/UL (ref 1.2–3.4)
LYMPHOCYTES NFR BLD AUTO: 4.4 % (ref 21–51)
MAGNESIUM SERPL-MCNC: 1.9 MG/DL (ref 1.6–2.6)
MCH RBC QN AUTO: 30.3 PG (ref 27–31)
MCV RBC AUTO: 94.6 FL (ref 78–98)
MONOCYTES # BLD AUTO: 0.3 THOU/UL (ref 0.11–0.59)
MONOCYTES NFR BLD AUTO: 4.1 % (ref 0–10)
NEUTROPHILS # BLD AUTO: 7.5 THOU/UL (ref 1.4–6.5)
NEUTROPHILS NFR BLD AUTO: 91.5 % (ref 42–75)
PLATELET # BLD AUTO: 131 10X3/UL (ref 130–400)
POTASSIUM SERPL-SCNC: 3.9 MMOL/L (ref 3.5–5.1)
RBC # BLD AUTO: 3.02 MILL/UL (ref 4.2–5.4)
SODIUM SERPL-SCNC: 133 MMOL/L (ref 136–145)
VANCOMYCIN SERPL-MCNC: 7.4 UG/ML
WBC # BLD AUTO: 8.2 10X3/UL (ref 4.8–10.8)

## 2022-11-20 PROCEDURE — 3E033XZ INTRODUCTION OF VASOPRESSOR INTO PERIPHERAL VEIN, PERCUTANEOUS APPROACH: ICD-10-PCS | Performed by: INTERNAL MEDICINE

## 2022-11-20 RX ADMIN — NOREPINEPHRINE BITARTRATE SCH MLS: 8 INJECTION, SOLUTION INTRAVENOUS at 16:31

## 2022-11-20 RX ADMIN — ALBUTEROL SULFATE SCH MG: 2.5 SOLUTION RESPIRATORY (INHALATION) at 14:03

## 2022-11-20 RX ADMIN — ALBUTEROL SULFATE SCH MG: 2.5 SOLUTION RESPIRATORY (INHALATION) at 07:03

## 2022-11-20 RX ADMIN — ALBUTEROL SULFATE SCH: 2.5 SOLUTION RESPIRATORY (INHALATION) at 18:25

## 2022-11-20 RX ADMIN — ALBUTEROL SULFATE SCH MG: 2.5 SOLUTION RESPIRATORY (INHALATION) at 01:27

## 2022-11-21 LAB
ANION GAP SERPL CALC-SCNC: 11 MMOL/L (ref 10–20)
BASOPHILS # BLD AUTO: 0 THOU/UL (ref 0–0.2)
BASOPHILS NFR BLD AUTO: 0 % (ref 0–1)
BUN SERPL-MCNC: 19 MG/DL (ref 9.8–20.1)
CALCIUM SERPL-MCNC: 7.6 MG/DL (ref 7.8–10.44)
CHLORIDE SERPL-SCNC: 108 MMOL/L (ref 98–107)
CO2 SERPL-SCNC: 18 MMOL/L (ref 23–31)
CREAT CL PREDICTED SERPL C-G-VRATE: 37 ML/MIN (ref 70–130)
EOSINOPHIL # BLD AUTO: 0 THOU/UL (ref 0–0.7)
EOSINOPHIL NFR BLD AUTO: 1.1 % (ref 0–10)
GLUCOSE SERPL-MCNC: 93 MG/DL (ref 80–115)
HGB BLD-MCNC: 9.1 G/DL (ref 12–16)
LYMPHOCYTES # BLD: 0.2 THOU/UL (ref 1.2–3.4)
LYMPHOCYTES NFR BLD AUTO: 6.3 % (ref 21–51)
MCH RBC QN AUTO: 30.3 PG (ref 27–31)
MCV RBC AUTO: 97 FL (ref 78–98)
MONOCYTES # BLD AUTO: 0.3 THOU/UL (ref 0.11–0.59)
MONOCYTES NFR BLD AUTO: 8.7 % (ref 0–10)
NEUTROPHILS # BLD AUTO: 2.9 THOU/UL (ref 1.4–6.5)
NEUTROPHILS NFR BLD AUTO: 83.9 % (ref 42–75)
PLATELET # BLD AUTO: 115 10X3/UL (ref 130–400)
POTASSIUM SERPL-SCNC: 4.4 MMOL/L (ref 3.5–5.1)
RBC # BLD AUTO: 3 MILL/UL (ref 4.2–5.4)
SODIUM SERPL-SCNC: 133 MMOL/L (ref 136–145)
WBC # BLD AUTO: 3.5 10X3/UL (ref 4.8–10.8)

## 2022-11-21 RX ADMIN — NOREPINEPHRINE BITARTRATE SCH MLS: 8 INJECTION, SOLUTION INTRAVENOUS at 06:50

## 2022-11-21 RX ADMIN — DEXTROSE AND SODIUM CHLORIDE SCH MLS: 5; 200 INJECTION, SOLUTION INTRAVENOUS at 23:54

## 2022-11-21 RX ADMIN — DEXTROSE AND SODIUM CHLORIDE SCH MLS: 5; 200 INJECTION, SOLUTION INTRAVENOUS at 10:42

## 2022-11-22 LAB
ANION GAP SERPL CALC-SCNC: 9 MMOL/L (ref 10–20)
BASOPHILS # BLD AUTO: 0 THOU/UL (ref 0–0.2)
BASOPHILS NFR BLD AUTO: 0 % (ref 0–1)
BUN SERPL-MCNC: 18 MG/DL (ref 9.8–20.1)
CALCIUM SERPL-MCNC: 7.8 MG/DL (ref 7.8–10.44)
CHLORIDE SERPL-SCNC: 105 MMOL/L (ref 98–107)
CO2 SERPL-SCNC: 23 MMOL/L (ref 23–31)
CREAT CL PREDICTED SERPL C-G-VRATE: 43 ML/MIN (ref 70–130)
EOSINOPHIL # BLD AUTO: 0 THOU/UL (ref 0–0.7)
EOSINOPHIL NFR BLD AUTO: 0.2 % (ref 0–10)
GLUCOSE SERPL-MCNC: 141 MG/DL (ref 80–115)
HGB BLD-MCNC: 7.7 G/DL (ref 12–16)
LYMPHOCYTES # BLD: 0.2 THOU/UL (ref 1.2–3.4)
LYMPHOCYTES NFR BLD AUTO: 8.1 % (ref 21–51)
MCH RBC QN AUTO: 30.2 PG (ref 27–31)
MCV RBC AUTO: 95.5 FL (ref 78–98)
MONOCYTES # BLD AUTO: 0.1 THOU/UL (ref 0.11–0.59)
MONOCYTES NFR BLD AUTO: 4.3 % (ref 0–10)
NEUTROPHILS # BLD AUTO: 2 THOU/UL (ref 1.4–6.5)
NEUTROPHILS NFR BLD AUTO: 87.3 % (ref 42–75)
PLATELET # BLD AUTO: 115 10X3/UL (ref 130–400)
POTASSIUM SERPL-SCNC: 4.5 MMOL/L (ref 3.5–5.1)
RBC # BLD AUTO: 2.54 MILL/UL (ref 4.2–5.4)
SODIUM SERPL-SCNC: 132 MMOL/L (ref 136–145)
WBC # BLD AUTO: 2.3 10X3/UL (ref 4.8–10.8)

## 2022-11-23 LAB
ANION GAP SERPL CALC-SCNC: 13 MMOL/L (ref 10–20)
BASOPHILS # BLD AUTO: 0 THOU/UL (ref 0–0.2)
BASOPHILS NFR BLD AUTO: 0 % (ref 0–1)
BUN SERPL-MCNC: 22 MG/DL (ref 9.8–20.1)
CALCIUM SERPL-MCNC: 8.2 MG/DL (ref 7.8–10.44)
CHLORIDE SERPL-SCNC: 108 MMOL/L (ref 98–107)
CO2 SERPL-SCNC: 20 MMOL/L (ref 23–31)
CREAT CL PREDICTED SERPL C-G-VRATE: 40 ML/MIN (ref 70–130)
EOSINOPHIL # BLD AUTO: 0 THOU/UL (ref 0–0.7)
EOSINOPHIL NFR BLD AUTO: 0.1 % (ref 0–10)
GLUCOSE SERPL-MCNC: 101 MG/DL (ref 80–115)
HGB BLD-MCNC: 8.1 G/DL (ref 12–16)
LYMPHOCYTES # BLD: 0.4 THOU/UL (ref 1.2–3.4)
LYMPHOCYTES NFR BLD AUTO: 8 % (ref 21–51)
MCH RBC QN AUTO: 29.7 PG (ref 27–31)
MCV RBC AUTO: 96.2 FL (ref 78–98)
MONOCYTES # BLD AUTO: 0.3 THOU/UL (ref 0.11–0.59)
MONOCYTES NFR BLD AUTO: 6.9 % (ref 0–10)
NEUTROPHILS # BLD AUTO: 3.8 THOU/UL (ref 1.4–6.5)
NEUTROPHILS NFR BLD AUTO: 85 % (ref 42–75)
PLATELET # BLD AUTO: 152 10X3/UL (ref 130–400)
POTASSIUM SERPL-SCNC: 4.4 MMOL/L (ref 3.5–5.1)
RBC # BLD AUTO: 2.74 MILL/UL (ref 4.2–5.4)
SODIUM SERPL-SCNC: 137 MMOL/L (ref 136–145)
WBC # BLD AUTO: 4.4 10X3/UL (ref 4.8–10.8)

## 2022-11-24 LAB
ANION GAP SERPL CALC-SCNC: 9 MMOL/L (ref 10–20)
BASOPHILS # BLD AUTO: 0 THOU/UL (ref 0–0.2)
BASOPHILS NFR BLD AUTO: 0 % (ref 0–1)
BUN SERPL-MCNC: 23 MG/DL (ref 9.8–20.1)
CALCIUM SERPL-MCNC: 8.1 MG/DL (ref 7.8–10.44)
CHLORIDE SERPL-SCNC: 108 MMOL/L (ref 98–107)
CO2 SERPL-SCNC: 25 MMOL/L (ref 23–31)
CREAT CL PREDICTED SERPL C-G-VRATE: 39 ML/MIN (ref 70–130)
EOSINOPHIL # BLD AUTO: 0 THOU/UL (ref 0–0.7)
EOSINOPHIL NFR BLD AUTO: 0.2 % (ref 0–10)
GLUCOSE SERPL-MCNC: 104 MG/DL (ref 80–115)
HGB BLD-MCNC: 8.7 G/DL (ref 12–16)
LYMPHOCYTES # BLD: 0.4 THOU/UL (ref 1.2–3.4)
LYMPHOCYTES NFR BLD AUTO: 7 % (ref 21–51)
MCH RBC QN AUTO: 30.2 PG (ref 27–31)
MCV RBC AUTO: 95.4 FL (ref 78–98)
MONOCYTES # BLD AUTO: 0.5 THOU/UL (ref 0.11–0.59)
MONOCYTES NFR BLD AUTO: 9.5 % (ref 0–10)
NEUTROPHILS # BLD AUTO: 4.2 THOU/UL (ref 1.4–6.5)
NEUTROPHILS NFR BLD AUTO: 83.4 % (ref 42–75)
PLATELET # BLD AUTO: 172 10X3/UL (ref 130–400)
POTASSIUM SERPL-SCNC: 4.6 MMOL/L (ref 3.5–5.1)
RBC # BLD AUTO: 2.88 MILL/UL (ref 4.2–5.4)
SODIUM SERPL-SCNC: 137 MMOL/L (ref 136–145)
WBC # BLD AUTO: 5 10X3/UL (ref 4.8–10.8)

## 2022-11-25 LAB
ANION GAP SERPL CALC-SCNC: 13 MMOL/L (ref 10–20)
BASOPHILS # BLD AUTO: 0 THOU/UL (ref 0–0.2)
BASOPHILS NFR BLD AUTO: 0.3 % (ref 0–1)
BUN SERPL-MCNC: 23 MG/DL (ref 9.8–20.1)
CALCIUM SERPL-MCNC: 8.2 MG/DL (ref 7.8–10.44)
CHLORIDE SERPL-SCNC: 107 MMOL/L (ref 98–107)
CO2 SERPL-SCNC: 24 MMOL/L (ref 23–31)
CREAT CL PREDICTED SERPL C-G-VRATE: 39 ML/MIN (ref 70–130)
EOSINOPHIL # BLD AUTO: 0 THOU/UL (ref 0–0.7)
EOSINOPHIL NFR BLD AUTO: 0.3 % (ref 0–10)
GLUCOSE SERPL-MCNC: 86 MG/DL (ref 80–115)
HGB BLD-MCNC: 8.6 G/DL (ref 12–16)
LYMPHOCYTES # BLD: 0.6 THOU/UL (ref 1.2–3.4)
LYMPHOCYTES NFR BLD AUTO: 9.1 % (ref 21–51)
MCH RBC QN AUTO: 31.6 PG (ref 27–31)
MCV RBC AUTO: 95.3 FL (ref 78–98)
MONOCYTES # BLD AUTO: 0.6 THOU/UL (ref 0.11–0.59)
MONOCYTES NFR BLD AUTO: 9.7 % (ref 0–10)
NEUTROPHILS # BLD AUTO: 4.9 THOU/UL (ref 1.4–6.5)
NEUTROPHILS NFR BLD AUTO: 80.6 % (ref 42–75)
PLATELET # BLD AUTO: 205 10X3/UL (ref 130–400)
POTASSIUM SERPL-SCNC: 4.9 MMOL/L (ref 3.5–5.1)
RBC # BLD AUTO: 2.73 MILL/UL (ref 4.2–5.4)
SODIUM SERPL-SCNC: 139 MMOL/L (ref 136–145)
WBC # BLD AUTO: 6 10X3/UL (ref 4.8–10.8)

## 2022-11-26 LAB
ANION GAP SERPL CALC-SCNC: 11 MMOL/L (ref 10–20)
BASOPHILS # BLD AUTO: 0 THOU/UL (ref 0–0.2)
BASOPHILS NFR BLD AUTO: 0 % (ref 0–1)
BUN SERPL-MCNC: 23 MG/DL (ref 9.8–20.1)
CALCIUM SERPL-MCNC: 7.9 MG/DL (ref 7.8–10.44)
CHLORIDE SERPL-SCNC: 105 MMOL/L (ref 98–107)
CO2 SERPL-SCNC: 26 MMOL/L (ref 23–31)
CREAT CL PREDICTED SERPL C-G-VRATE: 41 ML/MIN (ref 70–130)
EOSINOPHIL # BLD AUTO: 0 THOU/UL (ref 0–0.7)
EOSINOPHIL NFR BLD AUTO: 0.3 % (ref 0–10)
GLUCOSE SERPL-MCNC: 88 MG/DL (ref 80–115)
HGB BLD-MCNC: 8.2 G/DL (ref 12–16)
LYMPHOCYTES # BLD: 0.5 THOU/UL (ref 1.2–3.4)
LYMPHOCYTES NFR BLD AUTO: 10.2 % (ref 21–51)
MCH RBC QN AUTO: 29.3 PG (ref 27–31)
MCV RBC AUTO: 95 FL (ref 78–98)
MONOCYTES # BLD AUTO: 0.4 THOU/UL (ref 0.11–0.59)
MONOCYTES NFR BLD AUTO: 8.1 % (ref 0–10)
NEUTROPHILS # BLD AUTO: 4.3 THOU/UL (ref 1.4–6.5)
NEUTROPHILS NFR BLD AUTO: 81.4 % (ref 42–75)
PLATELET # BLD AUTO: 241 10X3/UL (ref 130–400)
POTASSIUM SERPL-SCNC: 5 MMOL/L (ref 3.5–5.1)
RBC # BLD AUTO: 2.8 MILL/UL (ref 4.2–5.4)
SODIUM SERPL-SCNC: 137 MMOL/L (ref 136–145)
WBC # BLD AUTO: 5.2 10X3/UL (ref 4.8–10.8)

## 2022-11-27 LAB
ALBUMIN SERPL BCG-MCNC: 2.7 G/DL (ref 3.4–4.8)
ALP SERPL-CCNC: 64 U/L (ref 40–110)
ALT SERPL W P-5'-P-CCNC: 27 U/L (ref 8–55)
ANION GAP SERPL CALC-SCNC: 10 MMOL/L (ref 10–20)
AST SERPL-CCNC: 19 U/L (ref 5–34)
BASOPHILS # BLD AUTO: 0 THOU/UL (ref 0–0.2)
BASOPHILS NFR BLD AUTO: 0 % (ref 0–1)
BILIRUB SERPL-MCNC: 0.4 MG/DL (ref 0.2–1.2)
BUN SERPL-MCNC: 28 MG/DL (ref 9.8–20.1)
CALCIUM SERPL-MCNC: 7.8 MG/DL (ref 7.8–10.44)
CHLORIDE SERPL-SCNC: 102 MMOL/L (ref 98–107)
CO2 SERPL-SCNC: 29 MMOL/L (ref 23–31)
CREAT CL PREDICTED SERPL C-G-VRATE: 47 ML/MIN (ref 70–130)
EOSINOPHIL # BLD AUTO: 0 THOU/UL (ref 0–0.7)
EOSINOPHIL NFR BLD AUTO: 0.4 % (ref 0–10)
GLOBULIN SER CALC-MCNC: 2.1 G/DL (ref 2.4–3.5)
GLUCOSE SERPL-MCNC: 89 MG/DL (ref 80–115)
HGB BLD-MCNC: 8.3 G/DL (ref 12–16)
LYMPHOCYTES # BLD: 0.5 THOU/UL (ref 1.2–3.4)
LYMPHOCYTES NFR BLD AUTO: 9.1 % (ref 21–51)
MAGNESIUM SERPL-MCNC: 1.7 MG/DL (ref 1.6–2.6)
MCH RBC QN AUTO: 29.8 PG (ref 27–31)
MCV RBC AUTO: 95 FL (ref 78–98)
MONOCYTES # BLD AUTO: 0.4 THOU/UL (ref 0.11–0.59)
MONOCYTES NFR BLD AUTO: 6.1 % (ref 0–10)
NEUTROPHILS # BLD AUTO: 4.9 THOU/UL (ref 1.4–6.5)
NEUTROPHILS NFR BLD AUTO: 84.4 % (ref 42–75)
PLATELET # BLD AUTO: 285 10X3/UL (ref 130–400)
POTASSIUM SERPL-SCNC: 4.9 MMOL/L (ref 3.5–5.1)
RBC # BLD AUTO: 2.77 MILL/UL (ref 4.2–5.4)
SODIUM SERPL-SCNC: 136 MMOL/L (ref 136–145)
WBC # BLD AUTO: 5.8 10X3/UL (ref 4.8–10.8)

## 2022-11-28 LAB
ALBUMIN SERPL BCG-MCNC: 2.8 G/DL (ref 3.4–4.8)
ALP SERPL-CCNC: 69 U/L (ref 40–110)
ALT SERPL W P-5'-P-CCNC: 29 U/L (ref 8–55)
ANION GAP SERPL CALC-SCNC: 9 MMOL/L (ref 10–20)
AST SERPL-CCNC: 19 U/L (ref 5–34)
BASOPHILS # BLD AUTO: 0 THOU/UL (ref 0–0.2)
BASOPHILS NFR BLD AUTO: 0.7 % (ref 0–1)
BILIRUB SERPL-MCNC: 0.4 MG/DL (ref 0.2–1.2)
BUN SERPL-MCNC: 26 MG/DL (ref 9.8–20.1)
CALCIUM SERPL-MCNC: 7.7 MG/DL (ref 7.8–10.44)
CHLORIDE SERPL-SCNC: 105 MMOL/L (ref 98–107)
CO2 SERPL-SCNC: 28 MMOL/L (ref 23–31)
COMM CRITICAL RESULTS DOC: (no result)
CREAT CL PREDICTED SERPL C-G-VRATE: 44 ML/MIN (ref 70–130)
EOSINOPHIL # BLD AUTO: 0 THOU/UL (ref 0–0.7)
EOSINOPHIL NFR BLD AUTO: 0.3 % (ref 0–10)
GLOBULIN SER CALC-MCNC: 2.2 G/DL (ref 2.4–3.5)
GLUCOSE SERPL-MCNC: 108 MG/DL (ref 80–115)
HGB BLD-MCNC: 8.7 G/DL (ref 12–16)
LYMPHOCYTES # BLD: 0.4 THOU/UL (ref 1.2–3.4)
LYMPHOCYTES NFR BLD AUTO: 7.5 % (ref 21–51)
MAGNESIUM SERPL-MCNC: 2.2 MG/DL (ref 1.6–2.6)
MCH RBC QN AUTO: 29.7 PG (ref 27–31)
MCV RBC AUTO: 95.3 FL (ref 78–98)
MONOCYTES # BLD AUTO: 0.5 THOU/UL (ref 0.11–0.59)
MONOCYTES NFR BLD AUTO: 8.5 % (ref 0–10)
NEUTROPHILS # BLD AUTO: 4.9 THOU/UL (ref 1.4–6.5)
NEUTROPHILS NFR BLD AUTO: 83 % (ref 42–75)
PLATELET # BLD AUTO: 333 10X3/UL (ref 130–400)
POTASSIUM SERPL-SCNC: 4.6 MMOL/L (ref 3.5–5.1)
RBC # BLD AUTO: 2.92 MILL/UL (ref 4.2–5.4)
SODIUM SERPL-SCNC: 137 MMOL/L (ref 136–145)
TROPONIN I SERPL DL<=0.01 NG/ML-MCNC: 0.46 NG/ML (ref ?–0.03)
TROPONIN I SERPL DL<=0.01 NG/ML-MCNC: 0.48 NG/ML (ref ?–0.03)
TROPONIN I SERPL DL<=0.01 NG/ML-MCNC: 0.56 NG/ML (ref ?–0.03)
WBC # BLD AUTO: 5.9 10X3/UL (ref 4.8–10.8)

## 2022-11-29 LAB
ANION GAP SERPL CALC-SCNC: 12 MMOL/L (ref 10–20)
BASOPHILS # BLD AUTO: 0 THOU/UL (ref 0–0.2)
BASOPHILS NFR BLD AUTO: 0.1 % (ref 0–1)
BUN SERPL-MCNC: 27 MG/DL (ref 9.8–20.1)
CALCIUM SERPL-MCNC: 8 MG/DL (ref 7.8–10.44)
CHLORIDE SERPL-SCNC: 104 MMOL/L (ref 98–107)
CO2 SERPL-SCNC: 26 MMOL/L (ref 23–31)
COMM CRITICAL RESULTS DOC: (no result)
COMM CRITICAL RESULTS DOC: (no result)
CREAT CL PREDICTED SERPL C-G-VRATE: 42 ML/MIN (ref 70–130)
EOSINOPHIL # BLD AUTO: 0 THOU/UL (ref 0–0.7)
EOSINOPHIL NFR BLD AUTO: 0.1 % (ref 0–10)
GLUCOSE SERPL-MCNC: 90 MG/DL (ref 80–115)
HGB BLD-MCNC: 8.9 G/DL (ref 12–16)
LYMPHOCYTES # BLD: 0.5 THOU/UL (ref 1.2–3.4)
LYMPHOCYTES NFR BLD AUTO: 8 % (ref 21–51)
MCH RBC QN AUTO: 30.1 PG (ref 27–31)
MCV RBC AUTO: 95.3 FL (ref 78–98)
MONOCYTES # BLD AUTO: 0.5 THOU/UL (ref 0.11–0.59)
MONOCYTES NFR BLD AUTO: 8.1 % (ref 0–10)
NEUTROPHILS # BLD AUTO: 5.2 THOU/UL (ref 1.4–6.5)
NEUTROPHILS NFR BLD AUTO: 83.8 % (ref 42–75)
PLATELET # BLD AUTO: 325 10X3/UL (ref 130–400)
POTASSIUM SERPL-SCNC: 5.1 MMOL/L (ref 3.5–5.1)
RBC # BLD AUTO: 2.96 MILL/UL (ref 4.2–5.4)
SODIUM SERPL-SCNC: 137 MMOL/L (ref 136–145)
TROPONIN I SERPL DL<=0.01 NG/ML-MCNC: 0.34 NG/ML (ref ?–0.03)
TROPONIN I SERPL DL<=0.01 NG/ML-MCNC: 0.38 NG/ML (ref ?–0.03)
TROPONIN I SERPL DL<=0.01 NG/ML-MCNC: 0.44 NG/ML (ref ?–0.03)
WBC # BLD AUTO: 6.2 10X3/UL (ref 4.8–10.8)

## 2022-11-30 LAB
ALBUMIN SERPL BCG-MCNC: 3.1 G/DL (ref 3.4–4.8)
ALP SERPL-CCNC: 78 U/L (ref 40–110)
ALT SERPL W P-5'-P-CCNC: 31 U/L (ref 8–55)
ANION GAP SERPL CALC-SCNC: 16 MMOL/L (ref 10–20)
AST SERPL-CCNC: 21 U/L (ref 5–34)
BASOPHILS # BLD AUTO: 0 THOU/UL (ref 0–0.2)
BASOPHILS NFR BLD AUTO: 0.3 % (ref 0–1)
BILIRUB SERPL-MCNC: 0.6 MG/DL (ref 0.2–1.2)
BUN SERPL-MCNC: 30 MG/DL (ref 9.8–20.1)
CALCIUM SERPL-MCNC: 8 MG/DL (ref 7.8–10.44)
CHLORIDE SERPL-SCNC: 104 MMOL/L (ref 98–107)
CO2 SERPL-SCNC: 24 MMOL/L (ref 23–31)
COMM CRITICAL RESULTS DOC: (no result)
COMM CRITICAL RESULTS DOC: (no result)
CREAT CL PREDICTED SERPL C-G-VRATE: 40 ML/MIN (ref 70–130)
EOSINOPHIL # BLD AUTO: 0 THOU/UL (ref 0–0.7)
EOSINOPHIL NFR BLD AUTO: 0.2 % (ref 0–10)
GLOBULIN SER CALC-MCNC: 2.2 G/DL (ref 2.4–3.5)
GLUCOSE SERPL-MCNC: 88 MG/DL (ref 80–115)
HGB BLD-MCNC: 9.4 G/DL (ref 12–16)
LYMPHOCYTES # BLD: 0.4 THOU/UL (ref 1.2–3.4)
LYMPHOCYTES NFR BLD AUTO: 5.5 % (ref 21–51)
MCH RBC QN AUTO: 29.5 PG (ref 27–31)
MCV RBC AUTO: 95.8 FL (ref 78–98)
MONOCYTES # BLD AUTO: 0.6 THOU/UL (ref 0.11–0.59)
MONOCYTES NFR BLD AUTO: 7.7 % (ref 0–10)
NEUTROPHILS # BLD AUTO: 6.6 THOU/UL (ref 1.4–6.5)
NEUTROPHILS NFR BLD AUTO: 86.4 % (ref 42–75)
PLATELET # BLD AUTO: 333 10X3/UL (ref 130–400)
POTASSIUM SERPL-SCNC: 5.2 MMOL/L (ref 3.5–5.1)
RBC # BLD AUTO: 3.17 MILL/UL (ref 4.2–5.4)
SODIUM SERPL-SCNC: 139 MMOL/L (ref 136–145)
TROPONIN I SERPL DL<=0.01 NG/ML-MCNC: 0.31 NG/ML (ref ?–0.03)
TROPONIN I SERPL DL<=0.01 NG/ML-MCNC: 0.33 NG/ML (ref ?–0.03)
WBC # BLD AUTO: 7.7 10X3/UL (ref 4.8–10.8)

## 2022-12-01 LAB
ALBUMIN SERPL BCG-MCNC: 3.1 G/DL (ref 3.4–4.8)
ALP SERPL-CCNC: 71 U/L (ref 40–110)
ALT SERPL W P-5'-P-CCNC: 30 U/L (ref 8–55)
ANION GAP SERPL CALC-SCNC: 10 MMOL/L (ref 10–20)
ANION GAP SERPL CALC-SCNC: 10 MMOL/L (ref 10–20)
ANION GAP SERPL CALC-SCNC: 18 MMOL/L (ref 10–20)
AST SERPL-CCNC: 23 U/L (ref 5–34)
BASOPHILS # BLD AUTO: 0.1 THOU/UL (ref 0–0.2)
BASOPHILS NFR BLD AUTO: 0.8 % (ref 0–1)
BILIRUB SERPL-MCNC: 0.5 MG/DL (ref 0.2–1.2)
BUN SERPL-MCNC: 26 MG/DL (ref 9.8–20.1)
BUN SERPL-MCNC: 26 MG/DL (ref 9.8–20.1)
BUN SERPL-MCNC: 27 MG/DL (ref 9.8–20.1)
CALCIUM SERPL-MCNC: 7.9 MG/DL (ref 7.8–10.44)
CALCIUM SERPL-MCNC: 7.9 MG/DL (ref 7.8–10.44)
CALCIUM SERPL-MCNC: 8.1 MG/DL (ref 7.8–10.44)
CHLORIDE SERPL-SCNC: 104 MMOL/L (ref 98–107)
CHLORIDE SERPL-SCNC: 105 MMOL/L (ref 98–107)
CHLORIDE SERPL-SCNC: 106 MMOL/L (ref 98–107)
CO2 SERPL-SCNC: 23 MMOL/L (ref 23–31)
CO2 SERPL-SCNC: 27 MMOL/L (ref 23–31)
CO2 SERPL-SCNC: 28 MMOL/L (ref 23–31)
CREAT CL PREDICTED SERPL C-G-VRATE: 32 ML/MIN (ref 70–130)
CREAT CL PREDICTED SERPL C-G-VRATE: 34 ML/MIN (ref 70–130)
CREAT CL PREDICTED SERPL C-G-VRATE: 36 ML/MIN (ref 70–130)
EOSINOPHIL # BLD AUTO: 0.1 THOU/UL (ref 0–0.7)
EOSINOPHIL NFR BLD AUTO: 1 % (ref 0–10)
GLOBULIN SER CALC-MCNC: 2.1 G/DL (ref 2.4–3.5)
GLUCOSE SERPL-MCNC: 121 MG/DL (ref 80–115)
GLUCOSE SERPL-MCNC: 123 MG/DL (ref 80–115)
GLUCOSE SERPL-MCNC: 71 MG/DL (ref 80–115)
HGB BLD-MCNC: 9.3 G/DL (ref 12–16)
LYMPHOCYTES # BLD: 0.8 THOU/UL (ref 1.2–3.4)
LYMPHOCYTES NFR BLD AUTO: 10 % (ref 21–51)
MCH RBC QN AUTO: 30 PG (ref 27–31)
MCV RBC AUTO: 94.8 FL (ref 78–98)
MONOCYTES # BLD AUTO: 0.7 THOU/UL (ref 0.11–0.59)
MONOCYTES NFR BLD AUTO: 8.6 % (ref 0–10)
NEUTROPHILS # BLD AUTO: 6.5 THOU/UL (ref 1.4–6.5)
NEUTROPHILS NFR BLD AUTO: 79.5 % (ref 42–75)
PLATELET # BLD AUTO: 297 10X3/UL (ref 130–400)
POTASSIUM SERPL-SCNC: 5.2 MMOL/L (ref 3.5–5.1)
POTASSIUM SERPL-SCNC: 5.3 MMOL/L (ref 3.5–5.1)
POTASSIUM SERPL-SCNC: 6.1 MMOL/L (ref 3.5–5.1)
RBC # BLD AUTO: 3.08 MILL/UL (ref 4.2–5.4)
SODIUM SERPL-SCNC: 138 MMOL/L (ref 136–145)
SODIUM SERPL-SCNC: 138 MMOL/L (ref 136–145)
SODIUM SERPL-SCNC: 139 MMOL/L (ref 136–145)
WBC # BLD AUTO: 8.2 10X3/UL (ref 4.8–10.8)

## 2022-12-01 RX ADMIN — ASPIRIN SCH MG: 81 TABLET ORAL at 09:38

## 2022-12-02 VITALS — DIASTOLIC BLOOD PRESSURE: 65 MMHG | SYSTOLIC BLOOD PRESSURE: 133 MMHG | TEMPERATURE: 97.9 F

## 2022-12-02 LAB
ANION GAP SERPL CALC-SCNC: 9 MMOL/L (ref 10–20)
BASOPHILS # BLD AUTO: 0 THOU/UL (ref 0–0.2)
BASOPHILS NFR BLD AUTO: 0 % (ref 0–1)
BUN SERPL-MCNC: 28 MG/DL (ref 9.8–20.1)
CALCIUM SERPL-MCNC: 7.8 MG/DL (ref 7.8–10.44)
CHLORIDE SERPL-SCNC: 106 MMOL/L (ref 98–107)
CO2 SERPL-SCNC: 27 MMOL/L (ref 23–31)
CREAT CL PREDICTED SERPL C-G-VRATE: 38 ML/MIN (ref 70–130)
EOSINOPHIL # BLD AUTO: 0.1 THOU/UL (ref 0–0.7)
EOSINOPHIL NFR BLD AUTO: 0.7 % (ref 0–10)
GLUCOSE SERPL-MCNC: 80 MG/DL (ref 80–115)
HGB BLD-MCNC: 8.7 G/DL (ref 12–16)
LYMPHOCYTES # BLD: 0.8 THOU/UL (ref 1.2–3.4)
LYMPHOCYTES NFR BLD AUTO: 10.3 % (ref 21–51)
MCH RBC QN AUTO: 29.8 PG (ref 27–31)
MCV RBC AUTO: 94.1 FL (ref 78–98)
MONOCYTES # BLD AUTO: 0.7 THOU/UL (ref 0.11–0.59)
MONOCYTES NFR BLD AUTO: 9.9 % (ref 0–10)
NEUTROPHILS # BLD AUTO: 5.8 THOU/UL (ref 1.4–6.5)
NEUTROPHILS NFR BLD AUTO: 79 % (ref 42–75)
PLATELET # BLD AUTO: 291 10X3/UL (ref 130–400)
POTASSIUM SERPL-SCNC: 4.8 MMOL/L (ref 3.5–5.1)
RBC # BLD AUTO: 2.92 MILL/UL (ref 4.2–5.4)
SODIUM SERPL-SCNC: 137 MMOL/L (ref 136–145)
WBC # BLD AUTO: 7.4 10X3/UL (ref 4.8–10.8)

## 2022-12-02 RX ADMIN — ASPIRIN SCH MG: 81 TABLET ORAL at 10:07

## 2022-12-11 ENCOUNTER — HOSPITAL ENCOUNTER (OUTPATIENT)
Dept: HOSPITAL 92 - 2SW | Age: 67
Setting detail: OBSERVATION
LOS: 1 days | Discharge: HOME | End: 2022-12-12
Attending: INTERNAL MEDICINE | Admitting: INTERNAL MEDICINE
Payer: MEDICARE

## 2022-12-11 ENCOUNTER — HOSPITAL ENCOUNTER (EMERGENCY)
Dept: HOSPITAL 92 - CSHERS | Age: 67
Discharge: ANOTHER HOSPITAL | End: 2022-12-11
Payer: MEDICARE

## 2022-12-11 VITALS — BODY MASS INDEX: 17.2 KG/M2

## 2022-12-11 DIAGNOSIS — Z95.5: ICD-10-CM

## 2022-12-11 DIAGNOSIS — Z88.5: ICD-10-CM

## 2022-12-11 DIAGNOSIS — R77.8: ICD-10-CM

## 2022-12-11 DIAGNOSIS — N17.9: ICD-10-CM

## 2022-12-11 DIAGNOSIS — F15.11: ICD-10-CM

## 2022-12-11 DIAGNOSIS — I10: ICD-10-CM

## 2022-12-11 DIAGNOSIS — I95.9: Primary | ICD-10-CM

## 2022-12-11 DIAGNOSIS — Z79.899: ICD-10-CM

## 2022-12-11 DIAGNOSIS — Z88.1: ICD-10-CM

## 2022-12-11 DIAGNOSIS — F17.210: ICD-10-CM

## 2022-12-11 DIAGNOSIS — I69.351: ICD-10-CM

## 2022-12-11 DIAGNOSIS — N18.30: ICD-10-CM

## 2022-12-11 DIAGNOSIS — I12.9: ICD-10-CM

## 2022-12-11 DIAGNOSIS — Z88.0: ICD-10-CM

## 2022-12-11 DIAGNOSIS — Z87.891: ICD-10-CM

## 2022-12-11 DIAGNOSIS — I25.2: ICD-10-CM

## 2022-12-11 DIAGNOSIS — Z79.82: ICD-10-CM

## 2022-12-11 DIAGNOSIS — K21.9: ICD-10-CM

## 2022-12-11 DIAGNOSIS — E78.5: ICD-10-CM

## 2022-12-11 DIAGNOSIS — I25.10: ICD-10-CM

## 2022-12-11 DIAGNOSIS — Z20.822: ICD-10-CM

## 2022-12-11 DIAGNOSIS — I73.9: ICD-10-CM

## 2022-12-11 DIAGNOSIS — I48.0: ICD-10-CM

## 2022-12-11 LAB
ALBUMIN SERPL BCG-MCNC: 3.8 G/DL (ref 3.4–4.8)
ALP SERPL-CCNC: 114 U/L (ref 40–110)
ALT SERPL W P-5'-P-CCNC: 29 U/L (ref 8–55)
ANALYZER IN CARDIO: (no result)
ANION GAP SERPL CALC-SCNC: 16 MMOL/L (ref 10–20)
AST SERPL-CCNC: 23 U/L (ref 5–34)
BASE EXCESS STD BLDV CALC-SCNC: 3.3 MEQ/L
BASOPHILS # BLD AUTO: 0 10X3/UL (ref 0–0.2)
BASOPHILS NFR BLD AUTO: 0.3 % (ref 0–2)
BILIRUB SERPL-MCNC: 0.7 MG/DL (ref 0.2–1.2)
BUN SERPL-MCNC: 35 MG/DL (ref 9.8–20.1)
CA-I BLDV-MCNC: 0.97 MMOL/L (ref 1.16–1.32)
CALCIUM SERPL-MCNC: 8.5 MG/DL (ref 7.8–10.44)
CHLORIDE BLDV-SCNC: 101 MMOL/L (ref 98–106)
CHLORIDE SERPL-SCNC: 100 MMOL/L (ref 98–107)
CK MB SERPL-MCNC: 3.5 NG/ML (ref 0–6.6)
CO2 SERPL-SCNC: 27 MMOL/L (ref 23–31)
CREAT CL PREDICTED SERPL C-G-VRATE: 0 ML/MIN (ref 70–130)
DRUG SCREEN CUTOFF: (no result)
EOSINOPHIL # BLD AUTO: 0.3 10X3/UL (ref 0–0.5)
EOSINOPHIL NFR BLD AUTO: 4.4 % (ref 0–6)
GLOBULIN SER CALC-MCNC: 2.5 G/DL (ref 2.4–3.5)
GLUCOSE SERPL-MCNC: 84 MG/DL (ref 80–115)
HCO3 BLDV-SCNC: 29 MEQ/L (ref 22–28)
HCT VFR BLDV CALC: 30 % (ref 36–47)
HGB BLD-MCNC: 10.8 G/DL (ref 12–15.5)
HGB BLDV-MCNC: 10.3 G/DL (ref 11.7–16.1)
LIPASE SERPL-CCNC: 80 U/L (ref 8–78)
LYMPHOCYTES NFR BLD AUTO: 8.6 % (ref 18–47)
MAGNESIUM SERPL-MCNC: 1.8 MG/DL (ref 1.6–2.6)
MCH RBC QN AUTO: 28.1 PG (ref 27–33)
MCV RBC AUTO: 90.1 FL (ref 81.6–98.3)
MONOCYTES # BLD AUTO: 0.6 10X3/UL (ref 0–1.1)
MONOCYTES NFR BLD AUTO: 8.9 % (ref 0–10)
NEUTROPHILS # BLD AUTO: 5.6 10X3/UL (ref 1.5–8.4)
NEUTROPHILS NFR BLD AUTO: 77.2 % (ref 40–75)
PLATELET # BLD AUTO: 205 10X3/UL (ref 150–450)
POTASSIUM BLDV-SCNC: 4.78 MMOL/L (ref 3.7–5.3)
POTASSIUM SERPL-SCNC: 4.4 MMOL/L (ref 3.5–5.1)
RAPIDCOMM COLLECT BY: (no result)
RBC # BLD AUTO: 3.85 10X6/UL (ref 3.9–5.03)
SODIUM BLDV-SCNC: 134.4 MMOL/L (ref 133–146)
SODIUM SERPL-SCNC: 139 MMOL/L (ref 136–145)
SPECIMEN DRAWN FROM PATIENT: (no result)
TROPONIN I SERPL DL<=0.01 NG/ML-MCNC: 0.04 NG/ML (ref ?–0.03)
TROPONIN I SERPL DL<=0.01 NG/ML-MCNC: 0.04 NG/ML (ref ?–0.03)
WBC # BLD AUTO: 7.2 10X3/UL (ref 3.5–10.5)

## 2022-12-11 PROCEDURE — 82553 CREATINE MB FRACTION: CPT

## 2022-12-11 PROCEDURE — 85025 COMPLETE CBC W/AUTO DIFF WBC: CPT

## 2022-12-11 PROCEDURE — 83605 ASSAY OF LACTIC ACID: CPT

## 2022-12-11 PROCEDURE — 83690 ASSAY OF LIPASE: CPT

## 2022-12-11 PROCEDURE — 82805 BLOOD GASES W/O2 SATURATION: CPT

## 2022-12-11 PROCEDURE — 96374 THER/PROPH/DIAG INJ IV PUSH: CPT

## 2022-12-11 PROCEDURE — 84484 ASSAY OF TROPONIN QUANT: CPT

## 2022-12-11 PROCEDURE — 83880 ASSAY OF NATRIURETIC PEPTIDE: CPT

## 2022-12-11 PROCEDURE — 0240U: CPT

## 2022-12-11 PROCEDURE — 80048 BASIC METABOLIC PNL TOTAL CA: CPT

## 2022-12-11 PROCEDURE — 80053 COMPREHEN METABOLIC PANEL: CPT

## 2022-12-11 PROCEDURE — 80306 DRUG TEST PRSMV INSTRMNT: CPT

## 2022-12-11 PROCEDURE — G0378 HOSPITAL OBSERVATION PER HR: HCPCS

## 2022-12-11 PROCEDURE — 83735 ASSAY OF MAGNESIUM: CPT

## 2022-12-11 PROCEDURE — 71045 X-RAY EXAM CHEST 1 VIEW: CPT

## 2022-12-11 PROCEDURE — 93005 ELECTROCARDIOGRAM TRACING: CPT

## 2022-12-11 PROCEDURE — 36415 COLL VENOUS BLD VENIPUNCTURE: CPT

## 2022-12-11 PROCEDURE — 82274 ASSAY TEST FOR BLOOD FECAL: CPT

## 2022-12-11 PROCEDURE — 94640 AIRWAY INHALATION TREATMENT: CPT

## 2022-12-11 RX ADMIN — ALBUTEROL SULFATE SCH MG: 1.25 SOLUTION RESPIRATORY (INHALATION) at 23:00

## 2022-12-12 VITALS — DIASTOLIC BLOOD PRESSURE: 78 MMHG | SYSTOLIC BLOOD PRESSURE: 159 MMHG | TEMPERATURE: 99.6 F

## 2022-12-12 LAB
ALBUMIN SERPL BCG-MCNC: 2.9 G/DL (ref 3.4–4.8)
ALP SERPL-CCNC: 83 U/L (ref 40–110)
ALT SERPL W P-5'-P-CCNC: 25 U/L (ref 8–55)
ANION GAP SERPL CALC-SCNC: 11 MMOL/L (ref 10–20)
ANION GAP SERPL CALC-SCNC: 12 MMOL/L (ref 10–20)
AST SERPL-CCNC: 23 U/L (ref 5–34)
BASOPHILS # BLD AUTO: 0 THOU/UL (ref 0–0.2)
BASOPHILS NFR BLD AUTO: 0.7 % (ref 0–1)
BILIRUB SERPL-MCNC: 0.4 MG/DL (ref 0.2–1.2)
BUN SERPL-MCNC: 20 MG/DL (ref 9.8–20.1)
BUN SERPL-MCNC: 28 MG/DL (ref 9.8–20.1)
CALCIUM SERPL-MCNC: 7.4 MG/DL (ref 7.8–10.44)
CALCIUM SERPL-MCNC: 8.1 MG/DL (ref 7.8–10.44)
CHLORIDE SERPL-SCNC: 108 MMOL/L (ref 98–107)
CHLORIDE SERPL-SCNC: 109 MMOL/L (ref 98–107)
CO2 SERPL-SCNC: 22 MMOL/L (ref 23–31)
CO2 SERPL-SCNC: 25 MMOL/L (ref 23–31)
CREAT CL PREDICTED SERPL C-G-VRATE: 26 ML/MIN (ref 70–130)
CREAT CL PREDICTED SERPL C-G-VRATE: 31 ML/MIN (ref 70–130)
EOSINOPHIL # BLD AUTO: 0.3 THOU/UL (ref 0–0.7)
EOSINOPHIL NFR BLD AUTO: 5.7 % (ref 0–10)
GLOBULIN SER CALC-MCNC: 1.9 G/DL (ref 2.4–3.5)
GLUCOSE SERPL-MCNC: 105 MG/DL (ref 80–115)
GLUCOSE SERPL-MCNC: 90 MG/DL (ref 80–115)
HGB BLD-MCNC: 8.8 G/DL (ref 12–16)
LYMPHOCYTES # BLD: 0.4 THOU/UL (ref 1.2–3.4)
LYMPHOCYTES NFR BLD AUTO: 7.4 % (ref 21–51)
MCH RBC QN AUTO: 28.4 PG (ref 27–31)
MCV RBC AUTO: 90.7 FL (ref 78–98)
MONOCYTES # BLD AUTO: 0.7 THOU/UL (ref 0.11–0.59)
MONOCYTES NFR BLD AUTO: 11.7 % (ref 0–10)
NEUTROPHILS # BLD AUTO: 4.2 THOU/UL (ref 1.4–6.5)
NEUTROPHILS NFR BLD AUTO: 74.5 % (ref 42–75)
PLATELET # BLD AUTO: 123 10X3/UL (ref 130–400)
POTASSIUM SERPL-SCNC: 4.4 MMOL/L (ref 3.5–5.1)
POTASSIUM SERPL-SCNC: 4.7 MMOL/L (ref 3.5–5.1)
RBC # BLD AUTO: 3.1 MILL/UL (ref 4.2–5.4)
SODIUM SERPL-SCNC: 139 MMOL/L (ref 136–145)
SODIUM SERPL-SCNC: 139 MMOL/L (ref 136–145)
TROPONIN I SERPL DL<=0.01 NG/ML-MCNC: 0.07 NG/ML (ref ?–0.03)
WBC # BLD AUTO: 5.7 10X3/UL (ref 4.8–10.8)

## 2022-12-12 RX ADMIN — ALBUTEROL SULFATE SCH MG: 1.25 SOLUTION RESPIRATORY (INHALATION) at 07:57

## 2022-12-12 RX ADMIN — ALBUTEROL SULFATE SCH: 1.25 SOLUTION RESPIRATORY (INHALATION) at 11:30

## 2022-12-12 RX ADMIN — ALBUTEROL SULFATE SCH: 1.25 SOLUTION RESPIRATORY (INHALATION) at 03:33

## 2022-12-12 RX ADMIN — ALBUTEROL SULFATE SCH: 1.25 SOLUTION RESPIRATORY (INHALATION) at 14:41

## 2023-01-13 ENCOUNTER — HOSPITAL ENCOUNTER (OUTPATIENT)
Dept: HOSPITAL 92 - CT | Age: 68
Discharge: HOME | End: 2023-01-13
Attending: THORACIC SURGERY (CARDIOTHORACIC VASCULAR SURGERY)
Payer: MEDICARE

## 2023-01-13 DIAGNOSIS — I73.9: Primary | ICD-10-CM

## 2023-01-13 LAB — EGFRCR SERPLBLD CKD-EPI 2021: 27 ML/MIN/{1.73_M2}

## 2023-01-13 PROCEDURE — 82565 ASSAY OF CREATININE: CPT

## 2023-02-03 ENCOUNTER — HOSPITAL ENCOUNTER (OUTPATIENT)
Dept: HOSPITAL 92 - CT | Age: 68
Discharge: HOME | End: 2023-02-03
Attending: THORACIC SURGERY (CARDIOTHORACIC VASCULAR SURGERY)
Payer: MEDICARE

## 2023-02-03 DIAGNOSIS — I73.9: Primary | ICD-10-CM

## 2023-02-03 LAB — EGFRCR SERPLBLD CKD-EPI 2021: 27 ML/MIN/{1.73_M2}

## 2023-02-03 PROCEDURE — 82565 ASSAY OF CREATININE: CPT

## 2023-02-03 PROCEDURE — 75635 CT ANGIO ABDOMINAL ARTERIES: CPT

## 2023-02-18 ENCOUNTER — HOSPITAL ENCOUNTER (EMERGENCY)
Dept: HOSPITAL 9 - MADERS | Age: 68
Discharge: HOME | End: 2023-02-18
Payer: MEDICARE

## 2023-02-18 DIAGNOSIS — M25.512: Primary | ICD-10-CM

## 2023-02-18 DIAGNOSIS — I10: ICD-10-CM

## 2023-02-18 DIAGNOSIS — H92.22: ICD-10-CM

## 2023-02-18 DIAGNOSIS — I25.2: ICD-10-CM

## 2023-02-18 DIAGNOSIS — Z86.73: ICD-10-CM

## 2023-02-18 DIAGNOSIS — Z95.5: ICD-10-CM

## 2023-02-18 DIAGNOSIS — Z87.891: ICD-10-CM

## 2023-02-18 DIAGNOSIS — K21.9: ICD-10-CM

## 2023-02-18 PROCEDURE — 96372 THER/PROPH/DIAG INJ SC/IM: CPT

## 2023-05-10 ENCOUNTER — HOSPITAL ENCOUNTER (EMERGENCY)
Dept: HOSPITAL 92 - ERS | Age: 68
Discharge: LEFT BEFORE DISCHARGE | End: 2023-05-10
Payer: MEDICARE

## 2023-05-10 DIAGNOSIS — N18.9: ICD-10-CM

## 2023-05-10 DIAGNOSIS — Z79.82: ICD-10-CM

## 2023-05-10 DIAGNOSIS — Z79.899: ICD-10-CM

## 2023-05-10 DIAGNOSIS — I12.9: Primary | ICD-10-CM

## 2023-05-10 DIAGNOSIS — Z87.891: ICD-10-CM

## 2023-05-10 DIAGNOSIS — R77.8: ICD-10-CM

## 2023-05-10 DIAGNOSIS — K21.9: ICD-10-CM

## 2023-05-10 LAB
ALBUMIN SERPL BCG-MCNC: 3.8 G/DL (ref 3.4–4.8)
ALP SERPL-CCNC: 94 U/L (ref 40–110)
ALT SERPL W P-5'-P-CCNC: 13 U/L (ref 8–55)
ANION GAP SERPL CALC-SCNC: 12 MMOL/L (ref 10–20)
AST SERPL-CCNC: 15 U/L (ref 5–34)
BASOPHILS # BLD AUTO: 0.1 THOU/UL (ref 0–0.2)
BASOPHILS NFR BLD AUTO: 1 % (ref 0–1)
BILIRUB SERPL-MCNC: 0.6 MG/DL (ref 0.2–1.2)
BUN SERPL-MCNC: 16 MG/DL (ref 9.8–20.1)
CALCIUM SERPL-MCNC: 9.3 MG/DL (ref 7.8–10.44)
CHLORIDE SERPL-SCNC: 106 MMOL/L (ref 98–107)
CK MB SERPL-MCNC: 2.2 NG/ML (ref 0–6.6)
CK MB SERPL-MCNC: 2.3 NG/ML (ref 0–6.6)
CO2 SERPL-SCNC: 28 MMOL/L (ref 23–31)
CREAT CL PREDICTED SERPL C-G-VRATE: 0 ML/MIN (ref 70–130)
EOSINOPHIL # BLD AUTO: 0.2 THOU/UL (ref 0–0.7)
EOSINOPHIL NFR BLD AUTO: 4.6 % (ref 0–10)
GLOBULIN SER CALC-MCNC: 2.6 G/DL (ref 2.4–3.5)
GLUCOSE SERPL-MCNC: 82 MG/DL (ref 80–115)
HGB BLD-MCNC: 13.5 G/DL (ref 12–16)
LYMPHOCYTES NFR BLD AUTO: 17.7 % (ref 21–51)
MCH RBC QN AUTO: 27.1 PG (ref 27–31)
MCV RBC AUTO: 89.2 FL (ref 78–98)
MONOCYTES # BLD AUTO: 0.6 THOU/UL (ref 0.11–0.59)
MONOCYTES NFR BLD AUTO: 11.9 % (ref 0–10)
NEUTROPHILS # BLD AUTO: 3.1 THOU/UL (ref 1.4–6.5)
NEUTROPHILS NFR BLD AUTO: 64.6 % (ref 42–75)
PLATELET # BLD AUTO: 211 10X3/UL (ref 130–400)
POTASSIUM SERPL-SCNC: 4.9 MMOL/L (ref 3.5–5.1)
RBC # BLD AUTO: 4.98 MILL/UL (ref 4.2–5.4)
SODIUM SERPL-SCNC: 141 MMOL/L (ref 136–145)
WBC # BLD AUTO: 4.8 10X3/UL (ref 4.8–10.8)

## 2023-05-10 PROCEDURE — 84484 ASSAY OF TROPONIN QUANT: CPT

## 2023-05-10 PROCEDURE — 36415 COLL VENOUS BLD VENIPUNCTURE: CPT

## 2023-05-10 PROCEDURE — 85025 COMPLETE CBC W/AUTO DIFF WBC: CPT

## 2023-05-10 PROCEDURE — 93005 ELECTROCARDIOGRAM TRACING: CPT

## 2023-05-10 PROCEDURE — 80053 COMPREHEN METABOLIC PANEL: CPT

## 2023-05-10 PROCEDURE — 82553 CREATINE MB FRACTION: CPT
